# Patient Record
Sex: FEMALE | Race: WHITE | NOT HISPANIC OR LATINO | Employment: FULL TIME | URBAN - METROPOLITAN AREA
[De-identification: names, ages, dates, MRNs, and addresses within clinical notes are randomized per-mention and may not be internally consistent; named-entity substitution may affect disease eponyms.]

---

## 2017-04-02 ENCOUNTER — APPOINTMENT (EMERGENCY)
Dept: RADIOLOGY | Facility: HOSPITAL | Age: 47
End: 2017-04-02
Payer: COMMERCIAL

## 2017-04-02 ENCOUNTER — HOSPITAL ENCOUNTER (EMERGENCY)
Facility: HOSPITAL | Age: 47
Discharge: HOME | End: 2017-04-02
Attending: EMERGENCY MEDICINE
Payer: COMMERCIAL

## 2017-04-02 VITALS
TEMPERATURE: 97.7 F | DIASTOLIC BLOOD PRESSURE: 95 MMHG | HEART RATE: 95 BPM | SYSTOLIC BLOOD PRESSURE: 163 MMHG | OXYGEN SATURATION: 100 % | WEIGHT: 290 LBS | RESPIRATION RATE: 20 BRPM

## 2017-04-02 DIAGNOSIS — M25.569 KNEE PAIN: Primary | ICD-10-CM

## 2017-04-02 PROCEDURE — 73564 X-RAY EXAM KNEE 4 OR MORE: CPT

## 2017-04-02 PROCEDURE — 99283 EMERGENCY DEPT VISIT LOW MDM: CPT

## 2017-04-02 RX ORDER — LEVOTHYROXINE SODIUM 0.2 MG/1
200 TABLET ORAL DAILY
COMMUNITY
End: 2018-05-18 | Stop reason: ALTCHOICE

## 2017-04-02 RX ORDER — NAPROXEN 375 MG/1
375 TABLET ORAL 2 TIMES DAILY WITH MEALS
Qty: 14 TABLET | Refills: 0 | Status: SHIPPED | OUTPATIENT
Start: 2017-04-02 | End: 2018-05-18 | Stop reason: ALTCHOICE

## 2017-04-05 ENCOUNTER — ALLSCRIPTS OFFICE VISIT (OUTPATIENT)
Dept: OTHER | Facility: OTHER | Age: 47
End: 2017-04-05

## 2018-01-12 VITALS
HEIGHT: 64 IN | DIASTOLIC BLOOD PRESSURE: 85 MMHG | HEART RATE: 81 BPM | WEIGHT: 293 LBS | SYSTOLIC BLOOD PRESSURE: 129 MMHG | BODY MASS INDEX: 50.02 KG/M2

## 2018-05-18 ENCOUNTER — APPOINTMENT (EMERGENCY)
Dept: CT IMAGING | Facility: HOSPITAL | Age: 48
End: 2018-05-18
Payer: COMMERCIAL

## 2018-05-18 ENCOUNTER — HOSPITAL ENCOUNTER (EMERGENCY)
Facility: HOSPITAL | Age: 48
Discharge: HOME | End: 2018-05-18
Payer: COMMERCIAL

## 2018-05-18 VITALS
WEIGHT: 280 LBS | RESPIRATION RATE: 18 BRPM | OXYGEN SATURATION: 96 % | TEMPERATURE: 99 F | SYSTOLIC BLOOD PRESSURE: 148 MMHG | HEART RATE: 93 BPM | DIASTOLIC BLOOD PRESSURE: 73 MMHG | HEIGHT: 64 IN | BODY MASS INDEX: 47.8 KG/M2

## 2018-05-18 DIAGNOSIS — S16.1XXA STRAIN OF NECK MUSCLE, INITIAL ENCOUNTER: ICD-10-CM
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
## 2018-05-18 PROCEDURE — 99284 EMERGENCY DEPT VISIT MOD MDM: CPT

## 2018-05-18 PROCEDURE — 72125 CT NECK SPINE W/O DYE: CPT

## 2018-05-18 RX ORDER — CYCLOBENZAPRINE HCL 5 MG
5 TABLET ORAL 3 TIMES DAILY PRN
Qty: 30 TABLET | Refills: 0 | Status: SHIPPED | OUTPATIENT
Start: 2018-05-18 | End: 2018-05-28
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
## 2019-11-02 ENCOUNTER — OFFICE VISIT (OUTPATIENT)
Dept: URGENT CARE | Facility: MEDICAL CENTER | Age: 49
End: 2019-11-02
Payer: COMMERCIAL

## 2019-11-02 VITALS
BODY MASS INDEX: 51.91 KG/M2 | SYSTOLIC BLOOD PRESSURE: 136 MMHG | TEMPERATURE: 97.8 F | OXYGEN SATURATION: 100 % | WEIGHT: 293 LBS | HEART RATE: 80 BPM | RESPIRATION RATE: 18 BRPM | HEIGHT: 63 IN | DIASTOLIC BLOOD PRESSURE: 83 MMHG

## 2019-11-02 DIAGNOSIS — S39.012A STRAIN OF LUMBAR REGION, INITIAL ENCOUNTER: Primary | ICD-10-CM

## 2019-11-02 PROCEDURE — 99213 OFFICE O/P EST LOW 20 MIN: CPT | Performed by: PHYSICIAN ASSISTANT

## 2019-11-02 RX ORDER — CYCLOBENZAPRINE HCL 10 MG
10 TABLET ORAL 3 TIMES DAILY
Qty: 15 TABLET | Refills: 0 | Status: SHIPPED | OUTPATIENT
Start: 2019-11-02 | End: 2021-10-07

## 2019-11-02 NOTE — PATIENT INSTRUCTIONS
1  Motrin as needed for pain  2  Take Flexeril 10mg  Up to 3x daily as needed for pain/spasm  3  Follow up with PCP in 3-5 days if symptoms persist   4  Proceed to  ER if symptoms worsen

## 2019-11-02 NOTE — PROGRESS NOTES
[facility] [facility]        NAME: Gilbert Chao is a 51 y o  female  : 1970    MRN: [id number]  DATE: 2019  TIME: 10:04 AM    Assessment and Plan   Strain of lumbar region, initial encounter [S39 012A]  1  Strain of lumbar region, initial encounter  cyclobenzaprine (FLEXERIL) 10 mg tablet         Patient Instructions     1  Motrin as needed for pain  2  Take Flexeril 10mg  Up to 3x daily as needed for pain/spasm  3  Follow up with PCP in 3-5 days if symptoms persist   4  Proceed to  ER if symptoms worsen  Chief Complaint     Chief Complaint   Patient presents with    Back Pain     Pt  C/O back pain for the past two days that began after twisting her back Thursday night  History of Present Illness       Dufm Blizzard is a 43-year-old female presents with left lower back pain that developed over the past 2 days  Patient reports she was bending, attempting to pick something up that fell from a garbage can when she felt discomfort in her left lower back  Patient denies any numbness, tingling, weakness or change in bowel or bladder functions from the onset of her symptoms  Patient reports tightness/stiffness in her lumbar region but no radiation of the pain into her buttocks, anterior thigh her down her leg  Review of Systems   Review of Systems   Constitutional: Negative  Gastrointestinal: Negative  Musculoskeletal: Positive for back pain  Negative for gait problem  Neurological: Negative for weakness and numbness           Current Medications       Current Outpatient Medications:     cyclobenzaprine (FLEXERIL) 10 mg tablet, Take 1 tablet (10 mg total) by mouth 3 (three) times a day for 5 days, Disp: 15 tablet, Rfl: 0    Current Allergies     Allergies as of 2019 - Reviewed 2019   Allergen Reaction Noted    Penicillins  2017    Sulfa antibiotics  2017            The following portions of the patient's history were reviewed and updated as appropriate: allergies, current medications, past family history, past medical history, past social history, past surgical history and problem list      Past Medical History:   Diagnosis Date    Disease of thyroid gland     RA (rheumatoid arthritis) ([facility] [address] )        Past Surgical History:   Procedure Laterality Date    HYSTERECTOMY      TONSILLECTOMY      WISDOM TOOTH EXTRACTION         No family history on file  Medications have been verified  Objective   /83   Pulse 80   Temp 97 8 °F (36 6 °C) (Temporal)   Resp 18   Ht 5' 3" (1 6 m)   Wt (!) 141 kg (310 lb 9 6 oz)   SpO2 100%   BMI 55 02 kg/m²        Physical Exam     Physical Exam   Constitutional: She appears well-developed and well-nourished  No distress  Cardiovascular: Normal rate, regular rhythm and normal heart sounds  No murmur heard  Pulmonary/Chest: Effort normal and breath sounds normal    Musculoskeletal:        Back:    Neurological: She has normal strength  No sensory deficit  Reflex Scores:       Patellar reflexes are 2+ on the right side and 2+ on the left side         Achilles reflexes are 2+ on the right side and 2+ on the left side   - SLR

## 2020-12-09 ENCOUNTER — NURSE TRIAGE (OUTPATIENT)
Dept: OTHER | Facility: OTHER | Age: 50
End: 2020-12-09

## 2021-10-01 ENCOUNTER — OFFICE VISIT (OUTPATIENT)
Dept: URGENT CARE | Facility: MEDICAL CENTER | Age: 51
End: 2021-10-01
Payer: COMMERCIAL

## 2021-10-01 VITALS
BODY MASS INDEX: 50.02 KG/M2 | HEIGHT: 64 IN | SYSTOLIC BLOOD PRESSURE: 140 MMHG | DIASTOLIC BLOOD PRESSURE: 78 MMHG | RESPIRATION RATE: 18 BRPM | WEIGHT: 293 LBS | HEART RATE: 84 BPM | TEMPERATURE: 97.5 F | OXYGEN SATURATION: 100 %

## 2021-10-01 DIAGNOSIS — M25.559 HIP PAIN: Primary | ICD-10-CM

## 2021-10-01 PROCEDURE — 99213 OFFICE O/P EST LOW 20 MIN: CPT | Performed by: PHYSICIAN ASSISTANT

## 2021-10-01 RX ORDER — METHOCARBAMOL 500 MG/1
500 TABLET, FILM COATED ORAL 4 TIMES DAILY
Qty: 20 TABLET | Refills: 0 | Status: SHIPPED | OUTPATIENT
Start: 2021-10-01 | End: 2021-10-06

## 2021-10-07 ENCOUNTER — OFFICE VISIT (OUTPATIENT)
Dept: OBGYN CLINIC | Facility: CLINIC | Age: 51
End: 2021-10-07
Payer: COMMERCIAL

## 2021-10-07 VITALS
HEIGHT: 64 IN | HEART RATE: 60 BPM | WEIGHT: 293 LBS | BODY MASS INDEX: 50.02 KG/M2 | DIASTOLIC BLOOD PRESSURE: 82 MMHG | SYSTOLIC BLOOD PRESSURE: 140 MMHG

## 2021-10-07 DIAGNOSIS — M47.816 LUMBAR FACET ARTHROPATHY: Primary | ICD-10-CM

## 2021-10-07 DIAGNOSIS — M54.16 RADICULOPATHY, LUMBAR REGION: ICD-10-CM

## 2021-10-07 DIAGNOSIS — M70.62 TROCHANTERIC BURSITIS OF LEFT HIP: ICD-10-CM

## 2021-10-07 DIAGNOSIS — R29.898 WEAKNESS OF BOTH HIPS: ICD-10-CM

## 2021-10-07 DIAGNOSIS — M51.26 BULGING LUMBAR DISC: ICD-10-CM

## 2021-10-07 DIAGNOSIS — M76.32 ILIOTIBIAL BAND SYNDROME, LEFT: ICD-10-CM

## 2021-10-07 PROCEDURE — 99204 OFFICE O/P NEW MOD 45 MIN: CPT | Performed by: FAMILY MEDICINE

## 2021-10-07 RX ORDER — DICLOFENAC SODIUM 75 MG/1
75 TABLET, DELAYED RELEASE ORAL 2 TIMES DAILY
Qty: 60 TABLET | Refills: 0 | Status: SHIPPED | OUTPATIENT
Start: 2021-10-07

## 2021-10-07 RX ORDER — PREDNISONE 20 MG/1
20 TABLET ORAL 2 TIMES DAILY WITH MEALS
Qty: 10 TABLET | Refills: 0 | Status: SHIPPED | OUTPATIENT
Start: 2021-10-07 | End: 2021-10-12

## 2021-10-13 ENCOUNTER — EVALUATION (OUTPATIENT)
Dept: PHYSICAL THERAPY | Facility: MEDICAL CENTER | Age: 51
End: 2021-10-13
Payer: COMMERCIAL

## 2021-10-13 DIAGNOSIS — M76.32 ILIOTIBIAL BAND SYNDROME, LEFT: ICD-10-CM

## 2021-10-13 DIAGNOSIS — M54.16 RADICULOPATHY, LUMBAR REGION: ICD-10-CM

## 2021-10-13 DIAGNOSIS — R29.898 WEAKNESS OF BOTH HIPS: ICD-10-CM

## 2021-10-13 DIAGNOSIS — M51.26 BULGING LUMBAR DISC: ICD-10-CM

## 2021-10-13 DIAGNOSIS — M70.62 TROCHANTERIC BURSITIS OF LEFT HIP: ICD-10-CM

## 2021-10-13 DIAGNOSIS — M25.552 LEFT HIP PAIN: Primary | ICD-10-CM

## 2021-10-13 DIAGNOSIS — M47.816 LUMBAR FACET ARTHROPATHY: ICD-10-CM

## 2021-10-13 PROCEDURE — 97162 PT EVAL MOD COMPLEX 30 MIN: CPT | Performed by: PHYSICAL THERAPIST

## 2021-10-13 PROCEDURE — 97140 MANUAL THERAPY 1/> REGIONS: CPT | Performed by: PHYSICAL THERAPIST

## 2021-10-18 ENCOUNTER — OFFICE VISIT (OUTPATIENT)
Dept: PHYSICAL THERAPY | Facility: MEDICAL CENTER | Age: 51
End: 2021-10-18
Payer: COMMERCIAL

## 2021-10-18 DIAGNOSIS — M51.26 BULGING LUMBAR DISC: ICD-10-CM

## 2021-10-18 DIAGNOSIS — R29.898 WEAKNESS OF BOTH HIPS: ICD-10-CM

## 2021-10-18 DIAGNOSIS — M70.62 TROCHANTERIC BURSITIS OF LEFT HIP: ICD-10-CM

## 2021-10-18 DIAGNOSIS — M25.552 LEFT HIP PAIN: Primary | ICD-10-CM

## 2021-10-18 DIAGNOSIS — M76.32 ILIOTIBIAL BAND SYNDROME, LEFT: ICD-10-CM

## 2021-10-18 DIAGNOSIS — M47.816 LUMBAR FACET ARTHROPATHY: ICD-10-CM

## 2021-10-18 PROCEDURE — 97140 MANUAL THERAPY 1/> REGIONS: CPT | Performed by: PHYSICAL THERAPIST

## 2021-10-18 PROCEDURE — 97110 THERAPEUTIC EXERCISES: CPT | Performed by: PHYSICAL THERAPIST

## 2021-10-20 ENCOUNTER — OFFICE VISIT (OUTPATIENT)
Dept: PHYSICAL THERAPY | Facility: MEDICAL CENTER | Age: 51
End: 2021-10-20
Payer: COMMERCIAL

## 2021-10-20 DIAGNOSIS — M54.16 RADICULOPATHY, LUMBAR REGION: ICD-10-CM

## 2021-10-20 DIAGNOSIS — R29.898 WEAKNESS OF BOTH HIPS: ICD-10-CM

## 2021-10-20 DIAGNOSIS — M25.552 LEFT HIP PAIN: Primary | ICD-10-CM

## 2021-10-20 DIAGNOSIS — M51.26 BULGING LUMBAR DISC: ICD-10-CM

## 2021-10-20 DIAGNOSIS — M76.32 ILIOTIBIAL BAND SYNDROME, LEFT: ICD-10-CM

## 2021-10-20 DIAGNOSIS — M70.62 TROCHANTERIC BURSITIS OF LEFT HIP: ICD-10-CM

## 2021-10-20 PROCEDURE — 97110 THERAPEUTIC EXERCISES: CPT | Performed by: PHYSICAL THERAPIST

## 2021-10-20 PROCEDURE — 97140 MANUAL THERAPY 1/> REGIONS: CPT | Performed by: PHYSICAL THERAPIST

## 2021-10-25 ENCOUNTER — OFFICE VISIT (OUTPATIENT)
Dept: PHYSICAL THERAPY | Facility: MEDICAL CENTER | Age: 51
End: 2021-10-25
Payer: COMMERCIAL

## 2021-10-25 DIAGNOSIS — R29.898 WEAKNESS OF BOTH HIPS: ICD-10-CM

## 2021-10-25 DIAGNOSIS — M51.26 BULGING LUMBAR DISC: ICD-10-CM

## 2021-10-25 DIAGNOSIS — M54.16 RADICULOPATHY, LUMBAR REGION: ICD-10-CM

## 2021-10-25 DIAGNOSIS — M25.552 LEFT HIP PAIN: Primary | ICD-10-CM

## 2021-10-25 DIAGNOSIS — M76.32 ILIOTIBIAL BAND SYNDROME, LEFT: ICD-10-CM

## 2021-10-25 DIAGNOSIS — M70.62 TROCHANTERIC BURSITIS OF LEFT HIP: ICD-10-CM

## 2021-10-25 DIAGNOSIS — M47.816 LUMBAR FACET ARTHROPATHY: ICD-10-CM

## 2021-10-25 PROCEDURE — 97110 THERAPEUTIC EXERCISES: CPT | Performed by: PHYSICAL THERAPIST

## 2021-10-25 PROCEDURE — 97112 NEUROMUSCULAR REEDUCATION: CPT | Performed by: PHYSICAL THERAPIST

## 2021-10-25 PROCEDURE — 97140 MANUAL THERAPY 1/> REGIONS: CPT | Performed by: PHYSICAL THERAPIST

## 2021-10-27 ENCOUNTER — OFFICE VISIT (OUTPATIENT)
Dept: PHYSICAL THERAPY | Facility: MEDICAL CENTER | Age: 51
End: 2021-10-27
Payer: COMMERCIAL

## 2021-10-27 DIAGNOSIS — R29.898 WEAKNESS OF BOTH HIPS: ICD-10-CM

## 2021-10-27 DIAGNOSIS — M51.26 BULGING LUMBAR DISC: ICD-10-CM

## 2021-10-27 DIAGNOSIS — M76.32 ILIOTIBIAL BAND SYNDROME, LEFT: ICD-10-CM

## 2021-10-27 DIAGNOSIS — M54.16 RADICULOPATHY, LUMBAR REGION: ICD-10-CM

## 2021-10-27 DIAGNOSIS — M25.552 LEFT HIP PAIN: Primary | ICD-10-CM

## 2021-10-27 DIAGNOSIS — M47.816 LUMBAR FACET ARTHROPATHY: ICD-10-CM

## 2021-10-27 DIAGNOSIS — M70.62 TROCHANTERIC BURSITIS OF LEFT HIP: ICD-10-CM

## 2021-10-27 PROCEDURE — 97140 MANUAL THERAPY 1/> REGIONS: CPT | Performed by: PHYSICAL THERAPIST

## 2021-10-27 PROCEDURE — 97110 THERAPEUTIC EXERCISES: CPT | Performed by: PHYSICAL THERAPIST

## 2021-11-01 ENCOUNTER — OFFICE VISIT (OUTPATIENT)
Dept: PHYSICAL THERAPY | Facility: MEDICAL CENTER | Age: 51
End: 2021-11-01
Payer: COMMERCIAL

## 2021-11-01 DIAGNOSIS — R29.898 WEAKNESS OF BOTH HIPS: ICD-10-CM

## 2021-11-01 DIAGNOSIS — M47.816 LUMBAR FACET ARTHROPATHY: ICD-10-CM

## 2021-11-01 DIAGNOSIS — M76.32 ILIOTIBIAL BAND SYNDROME, LEFT: ICD-10-CM

## 2021-11-01 DIAGNOSIS — M25.552 LEFT HIP PAIN: Primary | ICD-10-CM

## 2021-11-01 DIAGNOSIS — M54.16 RADICULOPATHY, LUMBAR REGION: ICD-10-CM

## 2021-11-01 DIAGNOSIS — M51.26 BULGING LUMBAR DISC: ICD-10-CM

## 2021-11-01 DIAGNOSIS — M70.62 TROCHANTERIC BURSITIS OF LEFT HIP: ICD-10-CM

## 2021-11-01 PROCEDURE — 97110 THERAPEUTIC EXERCISES: CPT | Performed by: PHYSICAL THERAPIST

## 2021-11-01 PROCEDURE — 97140 MANUAL THERAPY 1/> REGIONS: CPT | Performed by: PHYSICAL THERAPIST

## 2021-11-03 ENCOUNTER — OFFICE VISIT (OUTPATIENT)
Dept: PHYSICAL THERAPY | Facility: MEDICAL CENTER | Age: 51
End: 2021-11-03
Payer: COMMERCIAL

## 2021-11-03 DIAGNOSIS — R29.898 WEAKNESS OF BOTH HIPS: ICD-10-CM

## 2021-11-03 DIAGNOSIS — M25.552 LEFT HIP PAIN: Primary | ICD-10-CM

## 2021-11-03 DIAGNOSIS — M70.62 TROCHANTERIC BURSITIS OF LEFT HIP: ICD-10-CM

## 2021-11-03 DIAGNOSIS — M54.16 RADICULOPATHY, LUMBAR REGION: ICD-10-CM

## 2021-11-03 DIAGNOSIS — M51.26 BULGING LUMBAR DISC: ICD-10-CM

## 2021-11-03 DIAGNOSIS — M47.816 LUMBAR FACET ARTHROPATHY: ICD-10-CM

## 2021-11-03 DIAGNOSIS — M76.32 ILIOTIBIAL BAND SYNDROME, LEFT: ICD-10-CM

## 2021-11-03 PROCEDURE — 97110 THERAPEUTIC EXERCISES: CPT | Performed by: PHYSICAL THERAPIST

## 2021-11-03 PROCEDURE — 97140 MANUAL THERAPY 1/> REGIONS: CPT | Performed by: PHYSICAL THERAPIST

## 2021-11-08 ENCOUNTER — OFFICE VISIT (OUTPATIENT)
Dept: PHYSICAL THERAPY | Facility: MEDICAL CENTER | Age: 51
End: 2021-11-08
Payer: COMMERCIAL

## 2021-11-08 DIAGNOSIS — M25.552 LEFT HIP PAIN: Primary | ICD-10-CM

## 2021-11-08 DIAGNOSIS — M70.62 TROCHANTERIC BURSITIS OF LEFT HIP: ICD-10-CM

## 2021-11-08 DIAGNOSIS — R29.898 WEAKNESS OF BOTH HIPS: ICD-10-CM

## 2021-11-08 DIAGNOSIS — M76.32 ILIOTIBIAL BAND SYNDROME, LEFT: ICD-10-CM

## 2021-11-08 DIAGNOSIS — M54.16 RADICULOPATHY, LUMBAR REGION: ICD-10-CM

## 2021-11-08 DIAGNOSIS — M51.26 BULGING LUMBAR DISC: ICD-10-CM

## 2021-11-08 DIAGNOSIS — M47.816 LUMBAR FACET ARTHROPATHY: ICD-10-CM

## 2021-11-08 PROCEDURE — 97140 MANUAL THERAPY 1/> REGIONS: CPT | Performed by: PHYSICAL THERAPIST

## 2021-11-08 PROCEDURE — 97110 THERAPEUTIC EXERCISES: CPT | Performed by: PHYSICAL THERAPIST

## 2021-11-10 ENCOUNTER — EVALUATION (OUTPATIENT)
Dept: PHYSICAL THERAPY | Facility: MEDICAL CENTER | Age: 51
End: 2021-11-10
Payer: COMMERCIAL

## 2021-11-10 DIAGNOSIS — R29.898 WEAKNESS OF BOTH HIPS: ICD-10-CM

## 2021-11-10 DIAGNOSIS — M76.32 ILIOTIBIAL BAND SYNDROME, LEFT: ICD-10-CM

## 2021-11-10 DIAGNOSIS — M25.552 LEFT HIP PAIN: Primary | ICD-10-CM

## 2021-11-10 DIAGNOSIS — M51.26 BULGING LUMBAR DISC: ICD-10-CM

## 2021-11-10 DIAGNOSIS — M70.62 TROCHANTERIC BURSITIS OF LEFT HIP: ICD-10-CM

## 2021-11-10 DIAGNOSIS — M54.16 RADICULOPATHY, LUMBAR REGION: ICD-10-CM

## 2021-11-10 PROCEDURE — 97140 MANUAL THERAPY 1/> REGIONS: CPT | Performed by: PHYSICAL THERAPIST

## 2021-11-15 ENCOUNTER — APPOINTMENT (OUTPATIENT)
Dept: PHYSICAL THERAPY | Facility: MEDICAL CENTER | Age: 51
End: 2021-11-15
Payer: COMMERCIAL

## 2021-11-17 ENCOUNTER — APPOINTMENT (OUTPATIENT)
Dept: PHYSICAL THERAPY | Facility: MEDICAL CENTER | Age: 51
End: 2021-11-17
Payer: COMMERCIAL

## 2021-11-22 ENCOUNTER — APPOINTMENT (OUTPATIENT)
Dept: PHYSICAL THERAPY | Facility: MEDICAL CENTER | Age: 51
End: 2021-11-22
Payer: COMMERCIAL

## 2021-11-24 ENCOUNTER — APPOINTMENT (OUTPATIENT)
Dept: PHYSICAL THERAPY | Facility: MEDICAL CENTER | Age: 51
End: 2021-11-24
Payer: COMMERCIAL

## 2021-11-29 ENCOUNTER — APPOINTMENT (OUTPATIENT)
Dept: PHYSICAL THERAPY | Facility: MEDICAL CENTER | Age: 51
End: 2021-11-29
Payer: COMMERCIAL

## 2024-06-19 ENCOUNTER — OFFICE VISIT (OUTPATIENT)
Dept: BARIATRICS | Facility: CLINIC | Age: 54
End: 2024-06-19
Payer: COMMERCIAL

## 2024-06-19 VITALS
DIASTOLIC BLOOD PRESSURE: 80 MMHG | HEIGHT: 63 IN | BODY MASS INDEX: 51.91 KG/M2 | WEIGHT: 293 LBS | HEART RATE: 91 BPM | SYSTOLIC BLOOD PRESSURE: 130 MMHG

## 2024-06-19 DIAGNOSIS — E66.01 CLASS 3 SEVERE OBESITY DUE TO EXCESS CALORIES WITH BODY MASS INDEX (BMI) OF 60.0 TO 69.9 IN ADULT, UNSPECIFIED WHETHER SERIOUS COMORBIDITY PRESENT (HCC): Primary | ICD-10-CM

## 2024-06-19 PROBLEM — E66.813 CLASS 3 SEVERE OBESITY DUE TO EXCESS CALORIES WITH BODY MASS INDEX (BMI) OF 60.0 TO 69.9 IN ADULT (HCC): Status: ACTIVE | Noted: 2024-06-19

## 2024-06-19 PROCEDURE — 99204 OFFICE O/P NEW MOD 45 MIN: CPT | Performed by: INTERNAL MEDICINE

## 2024-06-19 RX ORDER — TURMERIC 400 MG
2 CAPSULE ORAL DAILY
COMMUNITY

## 2024-06-19 RX ORDER — DIPHENOXYLATE HYDROCHLORIDE AND ATROPINE SULFATE 2.5; .025 MG/1; MG/1
1 TABLET ORAL DAILY
COMMUNITY

## 2024-06-19 RX ORDER — LEVOTHYROXINE SODIUM 0.07 MG/1
75 TABLET ORAL DAILY
COMMUNITY

## 2024-06-19 RX ORDER — METFORMIN HYDROCHLORIDE 500 MG/1
TABLET, EXTENDED RELEASE ORAL
Qty: 60 TABLET | Refills: 3 | Status: SHIPPED | OUTPATIENT
Start: 2024-06-19

## 2024-06-19 NOTE — ASSESSMENT & PLAN NOTE
Nutrition: Discussed that patient would benefit from incorporating some protein based snacks in between meals to ensure meeting protein targets  -Work with RD for nutrition prescription including a calorie goal and protein target    Physical Activity: Encouraged 20-30 min walks; provided information regarding [id number] fitness program.  Consider 2 to 3 days of strength training using resistance bands and YouTube videos    Sleep: -STOP- BANG- 3/8 ; 7  hours -    Behavioral/Stress: Start tracking current intake using MFP, so adjustments can be made by the dietitian.  Could consider meeting with the LCSW to address emotional eating if needed  Antiobesity Medications/Medical -- Class III obesity with comorbidities of prediabetes, checking additional labs  -Discussed various medication options with the patient  -Given underlying insulin resistance and prediabetes we decided to start with metformin  -Other options such as Qsymia and its components phentermine and topiramate were discussed (BP today 130/80 heart rate of 91); no current contraindications  -Contrave and its components were discussed-patient describes some emotional eating so this could be a good option as well    -Patient has inquired with her insurance and they need a prior authorization for injectable medications such as Wegovy and Zepbound  -Inform patient regarding supply concerns with these agents  -Patient reports that she would prefer to start with an oral agent and a lifestyle program and make decisions regarding more aggressive medical therapy such as injectables down the road  -Patient was considered for the [facility] bariatric surgery program 1.5 years ago and had attended initial classes; patient decided against doing bariatric surgery and would like to do medical weight loss

## 2024-06-19 NOTE — PROGRESS NOTES
Assessment/Plan     Judy Pollard is 54 y.o. year old female  who comes in for consultation for assistance with weight management.     - Discussed options of HealthyCORE-Intensive Lifestyle Intervention Program, Very Low Calorie Diet-VLCD, and Conservative Program and the role of weight loss medications.  - Patient is interested in pursuing HealthyCORE-Intensive Lifestyle Intervention Program    Class 3 severe obesity due to excess calories with body mass index (BMI) of 60.0 to 69.9 in adult (HCC)  Nutrition: Discussed that patient would benefit from incorporating some protein based snacks in between meals to ensure meeting protein targets  -Work with RD for nutrition prescription including a calorie goal and protein target    Physical Activity: Encouraged 20-30 min walks; provided information regarding [employer] fitness program.  Consider 2 to 3 days of strength training using resistance bands and Formotus videos    Sleep: -STOP- BANG- 3/8 ; 7  hours -    Behavioral/Stress: Start tracking current intake using MFP, so adjustments can be made by the dietitian.  Could consider meeting with the LCSW to address emotional eating if needed  Antiobesity Medications/Medical -- Class III obesity with comorbidities of prediabetes, checking additional labs  -Discussed various medication options with the patient  -Given underlying insulin resistance and prediabetes we decided to start with metformin  -Other options such as Qsymia and its components phentermine and topiramate were discussed (BP today 130/80 heart rate of 91); no current contraindications  -Contrave and its components were discussed-patient describes some emotional eating so this could be a good option as well    -Patient has inquired with her insurance and they need a prior authorization for injectable medications such as Wegovy and Zepbound  -Inform patient regarding supply concerns with these agents  -Patient reports that she would prefer to start with an oral  agent and a lifestyle program and make decisions regarding more aggressive medical therapy such as injectables down the road  -Patient was considered for the [facility] bariatric surgery program 1.5 years ago and had attended initial classes; patient decided against doing bariatric surgery and would like to do medical weight loss      Judy was seen today for consult.    Diagnoses and all orders for this visit:    Class 3 severe obesity due to excess calories with body mass index (BMI) of 60.0 to 69.9 in adult, unspecified whether serious comorbidity present (HCC)  -     Insulin, fasting; Future  -     T3, free; Future  -     TSH, 3rd generation with Free T4 reflex; Future  -     Vitamin D 25 hydroxy; Future  -     Hemoglobin A1C; Future  -     Lipid panel; Future  -     metFORMIN (GLUCOPHAGE-XR) 500 mg 24 hr tablet; Start with 500 mg orally once daily with the evening meal increase in two weeks to 1000 mg (two tablets) if tolerated          -In addition, please follow general recommendations below.          Return visit:  6-8 weeks        General Lifestyle recommendations:    Nutrition   -Avoid skipping meals. Avoid sugary beverages. At least 64oz of water daily.  Limit processed food, refined sugars and grain. Encourage  healthy choices for meals and snacks   -Focus on protein goals and non starchy fiber rich vegetables for satiety effect and to help support a calorie deficit.   - Emphasize portion control, well balanced macronutrient's (protein, carbohydrate, fat using MyPlate method )and adequate protein with each meal/snacks and distributing calories equally throughout the day along with.   -Advise starting the day with a protein breakfast   Behavioral/Stress   Food log via alexia or provided paper log (alexia options include www.Seeqpod.com, sparkpeople.com, loseit.com, calorieking.com, Craft Dragon). Encouraged mindful eating. Be sure to set aside time to eat, eat slowly, and savor your food. Consider  "meditation apps and/or taking a few minutes of mindfulness every AM. Understand the role of regarding the role of stress hormone cortisol in promoting weight gain and visceral fat accumulation. Weigh daily or atleast 2-3 times/ week  Physical Activity   Increase physical activity by 10 minutes daily. Gradually increase physical activity to a goal of 5 days per week for 30 minutes of MODERATE intensity ( should be able to pass the \"talk test\" but should not be able to sing. Target 150-300 minutes  PLUS 2 days per week of FULL BODY resistance training. Progression will be addressed at follow up visits. Encouraged contemplation regarding establishing a daily physical activity routine  - Resistance training along with increase protein intake is important to maintain and enhance metabolism  Sleep   Encourage sleep hygiene and importance of having adequate sleep duration at least > 6 hours to support response in weight loss efforts    Handouts provided :  THRIVE program at [facility]  MyPlate and food quality  Food log resources, phone alexia or paper journal  Antiobesity medications options     - Discussed at length and the role of weight loss medications and medication options   - Explained the importance of making lifestyle changes in addition to starting any anti-obesity medications if the  patient chooses.  -  Initial weight loss goal of 5-10% weight loss for improved health  - Weight loss can improve patient's co-morbid conditions and/or prevent weight-related complications.  - Weight is not at goal and patient has been unable to achieve a meaningful weight loss above 5% using various programs and tools for more than 6 months    Judy was seen today for consult.    Diagnoses and all orders for this visit:    Class 3 severe obesity due to excess calories with body mass index (BMI) of 60.0 to 69.9 in adult, unspecified whether serious comorbidity present (HCC)  -     Insulin, fasting; Future  -     T3, free; " Future  -     TSH, 3rd generation with Free T4 reflex; Future  -     Vitamin D 25 hydroxy; Future  -     Hemoglobin A1C; Future  -     Lipid panel; Future  -     metFORMIN (GLUCOPHAGE-XR) 500 mg 24 hr tablet; Start with 500 mg orally once daily with the evening meal increase in two weeks to 1000 mg (two tablets) if tolerated         Metformin instructions: Cautions: Nausea and diarrhea are the most common side effects which may improve in 1 to 2 weeks. Hypoglycemia may occur with insufficient calorie intake, strenuous exercise, or concomitant use of hypoglycemic agents or ethanol; increased risk in elderly.   Low vitamin B12 levels may occur; monitoring recommended.  Recommended supplementation with a good-quality over-the-counter B complex vitamin.  B12 levels will be checked yearly and additional supplementation recommended if needed.    Adverse effects   Cobalamin deficiency (7% to 17.4% )  Gastrointestinal: Diarrhea (53% (immediate-release) ; 10% to 13% (extended-release) ), Flatulence , Indigestion, Malabsorption syndrome, Nausea (7% (extended-release) ), Vomiting (Up to 25.5% )             Total time spent reviewing chart, interviewing patient, examining patient, discussing plan, answering all questions, and documentin min, with >50% face-to-face time spent counseling patient on nonsurgical interventions for the treatment of excess weight. Discussed in detail nonsurgical options including intensive lifestyle intervention program, very low-calorie diet program and conservative program.  Discussed the role of weight loss medications.  Counseled patient on diet behavior and exercise modification for weight loss.            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, WFH, 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    Psycho social-lives with     Gyneac (Menopausal status/periods/contraception)-full hysterectomy at age 42         Start date: 6/19/24   Intial weight : 340 lbs  Intial BMI: 60.23 kg/m2  Obesity Class: Above 40- Obesity Class III  Goal weight: 220-230 lbs      Colonoscopy: UTD  Mammogram: UTD    History of present illness       Onset-- most of her life starting in her teens , mostly was after hysterectomy 2010, attributes stress and stress eating as one of the major contributors due to aging parents and aging in- laws    Fam hx of obesity-  both parents reports sister had bariatric surgery and is currently on Wegovy    Food behaviorsstress/emotional eating-Food is comforting    Previous Weight loss medications/Weight loss attempts:   [facility].  Due to hysterectomy and 2 C-sections she did not want to go through bariatric surgery. Insurance changed    Done weight watchers off-and-on for 30 years, easily gains weight with extra points  Patient reports and other history-    Self referred   Wt Readings from Last 20 Encounters:   06/19/24 (!) 154 kg (340 lb)   10/07/21 (!) 145 kg (318 lb 12.8 oz)   10/01/21 (!) 145 kg (319 lb)   11/02/19 (!) 141 kg (310 lb 9.6 oz)   05/18/18 127 kg (280 lb)   04/05/17 (!) 139 kg (307 lb)   04/02/17 132 kg (290 lb)           Medication considerations/contraindications     -Patient denies personal history of pancreatitis. Patient also denies personal and family history of medullary thyroid cancer and multiple endocrine neoplasia type 2 (MEN 2 tumor). -Patient denies any history of kidney stones, seizures, or glaucoma,  "diabetic retinopathy, gall bladder disease, hyperthyroidism.  -Denies Hx of CAD, PAD, palpitations, arrhythmia.   -Denies uncontrolled anxiety or depression, suicidal behavior or thinking , insomnia or sleep disturbance.         Past medical history/past surgical history       Previous notes and records have been reviewed.    The following portions of the patient's history were reviewed and updated as appropriate: allergies, current medications, past family history, past medical history, past social history, past surgical history, and problem list.    Past Medical History:   Diagnosis Date    Disease of thyroid gland     RA (rheumatoid arthritis) (HCC)          Past Surgical History:   Procedure Laterality Date    HYSTERECTOMY      TONSILLECTOMY      WISDOM TOOTH EXTRACTION               Family History   Problem Relation Age of Onset    Arthritis Mother     Hearing loss Mother         wears hearing aids    Hyperlipidemia Mother     Hyperlipidemia Father     Deep vein thrombosis Father     Cancer Maternal Aunt         breast            Objective     /80 (BP Location: Left arm, Patient Position: Sitting, Cuff Size: Large)   Pulse 91   Ht 5' 3\" (1.6 m)   Wt (!) 154 kg (340 lb)   BMI 60.23 kg/m²       Review of Systems   Constitutional:  Negative for fatigue.   HENT:  Negative for sore throat.    Respiratory:  Negative for cough and shortness of breath.    Cardiovascular:  Negative for chest pain, palpitations and leg swelling.   Gastrointestinal:  Negative for abdominal pain, constipation, diarrhea and nausea.   Genitourinary:  Negative for dysuria.   Musculoskeletal:  Negative for arthralgias and back pain.   Skin:  Negative for rash.   Neurological:  Negative for headaches.   Psychiatric/Behavioral:  Negative for dysphoric mood. The patient is not nervous/anxious.        Physical Exam  Vitals and nursing note reviewed.   Constitutional:       Appearance: Normal appearance.   HENT:      Head: Normocephalic. "   Neck:      Thyroid: No thyroid mass, thyromegaly or thyroid tenderness.   Cardiovascular:      Rate and Rhythm: Normal rate and regular rhythm.      Pulses: Normal pulses.      Heart sounds: Normal heart sounds.   Pulmonary:      Effort: Pulmonary effort is normal.      Breath sounds: Normal breath sounds.   Abdominal:      General: Bowel sounds are normal.      Palpations: Abdomen is soft.   Musculoskeletal:      Cervical back: Normal range of motion and neck supple. No tenderness.      Right lower leg: No edema.      Left lower leg: No edema.   Skin:     General: Skin is warm and dry.   Neurological:      General: No focal deficit present.      Mental Status: She is alert and oriented to person, place, and time.   Psychiatric:         Mood and Affect: Mood normal.         Behavior: Behavior normal.         Thought Content: Thought content normal.         Judgment: Judgment normal.            Medications       Current Outpatient Medications:     Glucosamine-Chondroitin (GLUCOSAMINE CHONDR COMPLEX PO), Take 2 tablets by mouth daily, Disp: , Rfl:     levothyroxine 75 mcg tablet, Take 75 mcg by mouth daily, Disp: , Rfl:     metFORMIN (GLUCOPHAGE-XR) 500 mg 24 hr tablet, Start with 500 mg orally once daily with the evening meal increase in two weeks to 1000 mg (two tablets) if tolerated, Disp: 60 tablet, Rfl: 3    multivitamin (THERAGRAN) TABS, Take 1 tablet by mouth daily, Disp: , Rfl:     TART CHERRY PO, Take 2 capsules by mouth daily, Disp: , Rfl:     Turmeric 400 MG CAPS, Take 2 capsules by mouth daily, Disp: , Rfl:     diclofenac (VOLTAREN) 75 mg EC tablet, Take 1 tablet (75 mg total) by mouth 2 (two) times a day (Patient not taking: Reported on 6/19/2024), Disp: 60 tablet, Rfl: 0    methocarbamol (ROBAXIN) 500 mg tablet, Take 1 tablet (500 mg total) by mouth 4 (four) times a day for 5 days (Patient not taking: Reported on 6/19/2024), Disp: 20 tablet, Rfl: 0           Labs and imaging     Recent labs and imaging  "have been personally reviewed.  Lab Results   Component Value Date    WBC 7.69 01/01/2016    HGB 11.7 01/01/2016    HCT 37.4 01/01/2016    MCV 81 (L) 01/01/2016     01/01/2016     Lab Results   Component Value Date     01/01/2016    SODIUM 143 10/09/2023    K 4.4 10/09/2023     10/09/2023    CO2 28 10/09/2023    ANIONGAP 5 01/01/2016    AGAP 8 10/09/2023    BUN 10 10/09/2023    CREATININE 0.91 10/09/2023    GLUC 106 (H) 10/09/2023    CALCIUM 9.3 10/09/2023    AST 20 10/09/2023    ALT 22 10/09/2023    ALKPHOS 68 10/09/2023    PROT 6.9 12/26/2015    TP 6.7 10/09/2023    BILITOT 0.95 12/26/2015    TBILI 0.9 10/09/2023    EGFR 76 10/09/2023     Lab Results   Component Value Date    HGBA1C 6.1 (H) 10/09/2023     Lab Results   Component Value Date    TSH 14.79 (H) 10/09/2023     No results found for: \"CHOLESTEROL\"  No results found for: \"HDL\"  No results found for: \"TRIG\"  No results found for: \"LDLCALC\"                   "

## 2024-06-21 DIAGNOSIS — Z00.6 ENCOUNTER FOR EXAMINATION FOR NORMAL COMPARISON OR CONTROL IN CLINICAL RESEARCH PROGRAM: ICD-10-CM

## 2024-06-22 ENCOUNTER — APPOINTMENT (OUTPATIENT)
Dept: LAB | Facility: MEDICAL CENTER | Age: 54
End: 2024-06-22
Payer: COMMERCIAL

## 2024-06-22 DIAGNOSIS — E66.01 CLASS 3 SEVERE OBESITY DUE TO EXCESS CALORIES WITH BODY MASS INDEX (BMI) OF 60.0 TO 69.9 IN ADULT, UNSPECIFIED WHETHER SERIOUS COMORBIDITY PRESENT (HCC): ICD-10-CM

## 2024-06-22 DIAGNOSIS — Z00.6 ENCOUNTER FOR EXAMINATION FOR NORMAL COMPARISON OR CONTROL IN CLINICAL RESEARCH PROGRAM: ICD-10-CM

## 2024-06-22 LAB
25(OH)D3 SERPL-MCNC: 22.1 NG/ML (ref 30–100)
CHOLEST SERPL-MCNC: 177 MG/DL
EST. AVERAGE GLUCOSE BLD GHB EST-MCNC: 123 MG/DL
HBA1C MFR BLD: 5.9 %
HDLC SERPL-MCNC: 49 MG/DL
INSULIN SERPL-ACNC: 21.41 UIU/ML (ref 1.9–23)
LDLC SERPL CALC-MCNC: 108 MG/DL (ref 0–100)
NONHDLC SERPL-MCNC: 128 MG/DL
T3FREE SERPL-MCNC: 3.63 PG/ML (ref 2.5–3.9)
T4 FREE SERPL-MCNC: 0.7 NG/DL (ref 0.61–1.12)
TRIGL SERPL-MCNC: 101 MG/DL
TSH SERPL DL<=0.05 MIU/L-ACNC: 14.48 UIU/ML (ref 0.45–4.5)

## 2024-06-22 PROCEDURE — 84439 ASSAY OF FREE THYROXINE: CPT

## 2024-06-22 PROCEDURE — 83036 HEMOGLOBIN GLYCOSYLATED A1C: CPT

## 2024-06-22 PROCEDURE — 84443 ASSAY THYROID STIM HORMONE: CPT

## 2024-06-22 PROCEDURE — 36415 COLL VENOUS BLD VENIPUNCTURE: CPT

## 2024-06-22 PROCEDURE — 82306 VITAMIN D 25 HYDROXY: CPT

## 2024-06-22 PROCEDURE — 84481 FREE ASSAY (FT-3): CPT

## 2024-06-22 PROCEDURE — 80061 LIPID PANEL: CPT

## 2024-06-22 PROCEDURE — 83525 ASSAY OF INSULIN: CPT

## 2024-07-05 LAB
APOB+LDLR+PCSK9 GENE MUT ANL BLD/T: NOT DETECTED
BRCA1+BRCA2 DEL+DUP + FULL MUT ANL BLD/T: NOT DETECTED
MLH1+MSH2+MSH6+PMS2 GN DEL+DUP+FUL M: NOT DETECTED

## 2024-07-22 ENCOUNTER — CLINICAL SUPPORT (OUTPATIENT)
Dept: BARIATRICS | Facility: CLINIC | Age: 54
End: 2024-07-22

## 2024-07-22 VITALS — HEIGHT: 63 IN | WEIGHT: 293 LBS | BODY MASS INDEX: 51.91 KG/M2

## 2024-07-22 DIAGNOSIS — R63.5 ABNORMAL WEIGHT GAIN: Primary | ICD-10-CM

## 2024-07-22 PROCEDURE — RECHECK

## 2024-07-22 PROCEDURE — WMPRO12

## 2024-07-22 NOTE — PROGRESS NOTES
Weight Management Medical Nutrition Assessment    Pt presents today for Healthy Core start with current weight of 339#.  Struggled with weight her whole life.  Went through [facility] due to insurance but wasn't happy with their program because she felt they were pushing surgery.  Insurance has since changed and pt is now interested in starting Healthy Core.  Pt has struggled with her weight most of her life and has tried multiple diets. Reviewed her diet recall and she sounds to be making fairly healthy food choices but question portions. Did suggest pt eat within one hour of waking instead of waiting about 3hrs. Suggest a protein shake early and can still have her eggs at are 10am break.  Came up with meal ideas focusing on lean protien at each meal and snack and eating a little something about every 3-4hrs. Pt will start the classes tomorrow night at 6pm. All other f/u appt scheduled..      Patient seen by Medical Provider in past 6 months:  yes  Requested to schedule appointment with Medical Provider: Yes, scheduled      Anthropometric Measurements  Start Weight (#): 340#  Current Weight (#): 339#  TBW % Change from start weight:-  Ideal Body Weight (#):115#  Goal Weight (#):<200#  Highest:  Lowest:    Weight Loss History  Previous weight loss attempts: Commercial Programs (Weight Watchers, Minded, etc.)  Exercise  Self Created Diets (Portion Control, Healthy Food Choices, etc.)  Zoom and how blood sugars responded (based off of glycemic index)    Food and Nutrition Related History  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  Weekends:  out with friends some, orders veggies instead of fries and burger w/o bun  Weakness:  variety    Beverages: water and coffee with oatmilk creamer, juice/soda is very rare  Volume of beverage intake: 2L--soda stream    Weekends: Worse, a little more eating out but still trying to make better choices ie:  veggies instead of fries or burger w/o bun  Cravings: salty crunchy snacks  Trouble area of day:  at times when it is a slow time of day, boredom eating    Frequency of Eating out: 2 times per week --Friday and/or Saturday  Food restrictions: tries to avoid gluten  Cooking: self   Food Shopping: self  Lives with her  and college daughter who will be on campus starting in Sept.     Physical Activity Intake  Activity:Biking, stationary + rebounder is up to 6-7 mins per day  Frequency: 3-4 times per week  , 20-30 mins  Physical limitations/barriers to exercise: some autoimmune issues    Estimated Needs  Energy  SECA: BMR:2095      X 1.3 -1000 = 1723  [facility] Energy Needs:   BMR : 2112     1-2# loss weekly sedentary:  4108-6372               1-2# loss weekly lightly active:8366-7489  Maintenance calories for sedentary activity level: 2534  Protein:63-78gm      (1.2-1.5g/kg IBW)  Fluid: 1820ml     (35mL/kg IBW)    Nutrition Diagnosis  Yes;    Overweight/obesity  related to Excess energy intake as evidenced by  BMI more than normative standard for age and sex (obesity-grade III 40+)       Nutrition Intervention    Nutrition Prescription  Calories:4963-5834 cals  Protein:65-75gm  Fluid:1800ml    Meal Plan (Joce/Pro/Carb)  Breakfast:  200, 30g  Snack: 250, 14  Lunch:400, 21g  Snack:150-200, 0-5  Dinner:500, 28g  Snack:150, 0-5gm    Nutrition Education:    Healthy Core Manual  Calorie controlled menu  Lean protein food choices  Healthy snack options  Food journaling tips      Nutrition Counseling:  Strategies: meal planning,  portion sizes, healthy snack choices, hydration, fiber intake, protein intake, exercise, food journal      Monitoring and Evaluation:  Evaluation criteria:  Energy Intake  Meet protein needs  Maintain adequate hydration  Monitor weekly weight  Meal planning/preparation  Food journal   Decreased portions at mealtimes and snacks  Physical activity     Barriers to learning:none  Readiness to change: Preparation:  (Getting ready to change)   Comprehension: good  Expected Compliance: good

## 2024-07-23 ENCOUNTER — CLINICAL SUPPORT (OUTPATIENT)
Dept: BARIATRICS | Facility: CLINIC | Age: 54
End: 2024-07-23

## 2024-07-23 VITALS — WEIGHT: 293 LBS | HEIGHT: 63 IN | BODY MASS INDEX: 51.91 KG/M2

## 2024-07-23 DIAGNOSIS — R63.5 ABNORMAL WEIGHT GAIN: Primary | ICD-10-CM

## 2024-07-23 PROCEDURE — RECHECK

## 2024-07-30 ENCOUNTER — CLINICAL SUPPORT (OUTPATIENT)
Dept: BARIATRICS | Facility: CLINIC | Age: 54
End: 2024-07-30

## 2024-07-30 VITALS — WEIGHT: 293 LBS | HEIGHT: 63 IN | BODY MASS INDEX: 51.91 KG/M2

## 2024-07-30 DIAGNOSIS — R63.5 ABNORMAL WEIGHT GAIN: Primary | ICD-10-CM

## 2024-07-30 PROCEDURE — RECHECK

## 2024-08-06 ENCOUNTER — CLINICAL SUPPORT (OUTPATIENT)
Dept: BARIATRICS | Facility: CLINIC | Age: 54
End: 2024-08-06

## 2024-08-06 VITALS — BODY MASS INDEX: 5.92 KG/M2 | WEIGHT: 33.4 LBS | HEIGHT: 63 IN

## 2024-08-06 DIAGNOSIS — R63.5 ABNORMAL WEIGHT GAIN: Primary | ICD-10-CM

## 2024-08-06 PROCEDURE — RECHECK

## 2024-08-13 ENCOUNTER — CLINICAL SUPPORT (OUTPATIENT)
Dept: BARIATRICS | Facility: CLINIC | Age: 54
End: 2024-08-13

## 2024-08-13 VITALS — HEIGHT: 63 IN | WEIGHT: 293 LBS | BODY MASS INDEX: 51.91 KG/M2

## 2024-08-13 DIAGNOSIS — R63.5 ABNORMAL WEIGHT GAIN: Primary | ICD-10-CM

## 2024-08-13 PROCEDURE — RECHECK

## 2024-08-15 ENCOUNTER — CLINICAL SUPPORT (OUTPATIENT)
Dept: BARIATRICS | Facility: CLINIC | Age: 54
End: 2024-08-15

## 2024-08-15 VITALS — BODY MASS INDEX: 51.91 KG/M2 | HEIGHT: 63 IN | WEIGHT: 293 LBS

## 2024-08-15 DIAGNOSIS — R63.5 ABNORMAL WEIGHT GAIN: Primary | ICD-10-CM

## 2024-08-15 PROCEDURE — RECHECK

## 2024-08-15 NOTE — PROGRESS NOTES
Weight Management Medical Nutrition Assessment    Pt presents today for Healthy Core 2 week f/u with current weight of 330.6#.  She has lost 9.4# since her first visit with the provider on June 19th which is 2.7% in the past two months with most of the weight loss since she started Healthy Core about one month ago. She is averaging about 2# per week since HC start. She reports she is noticing more energy and feeling satisfied through the day. Reviewed diet recall and most days she is consuming 3107-5411 calories, most average abut 1600 cals and also hitting 85-95gm of protein per day.  Reviewed body comp with pt and provide with report. She has paid for the next month of classes therefore scheduled pt for 2nd month RD visit on Sept 17th.      Patient seen by Medical Provider in past 6 months:  yes  Requested to schedule appointment with Medical Provider: Yes, scheduled    Anthropometric Measurements  Start Weight (#): 340# (6/19/24)  Current Weight (#): 330.6#  TBW % Change from start weight: 2%  Ideal Body Weight (#):115#  Goal Weight (#):<200#    Weight Loss History  Previous weight loss attempts: Commercial Programs (Weight Watchers, Zenops, etc.)  Exercise  Self Created Diets (Portion Control, Healthy Food Choices, etc.)  Zoom and how blood sugars responded (based off of glycemic index)    Food and Nutrition Related History  Wake up: 7am --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 Felt that when she incorporated melon for some reason she didn't see a drop in her weight so cut it out.  Suggested berries for more fiber.     Snack:-  Lunch: 1pm- bagged salad with the protein (chicken) with some of the dressing  Snack:-  Dinner: 6p protein, starch, veggies (chicken and fish, not often beef)  Snack: not often  Weekends:  out with friends some, orders  veggies instead of fries and burger w/o bun  Weakness:  variety    Beverages: water and coffee with oatmilk creamer, juice/soda is very rare  Volume of beverage intake: 2L--soda stream    Weekends: Worse, a little more eating out but still trying to make better choices ie:  veggies instead of fries or burger w/o bun  Cravings: salty crunchy snacks  Trouble area of day:  at times when it is a slow time of day, boredom eating    Frequency of Eating out: 2 times per week --Friday and/or Saturday  Food restrictions: tries to avoid gluten  Cooking: self   Food Shopping: self  Lives with her  and college daughter who will be on campus starting in Sept.     Physical Activity Intake  Activity:Biking, stationary + rebounder is up to 6-7 mins per day, now up to 10 mins per day  Frequency: 3-4 times per week  , 20-30 mins  Physical limitations/barriers to exercise: some autoimmune issues    Estimated Needs  Energy  SECA: BMR:2095      X 1.3 -1000 = 1723  [facility] Energy Needs:   BMR : 2112     1-2# loss weekly sedentary:  0300-5406               1-2# loss weekly lightly active:5515-6757  Maintenance calories for sedentary activity level: 2534  Protein:63-78gm      (1.2-1.5g/kg IBW)  Fluid: 1820ml     (35mL/kg IBW)    Nutrition Diagnosis  Yes;    Overweight/obesity  related to Excess energy intake as evidenced by  BMI more than normative standard for age and sex (obesity-grade III 40+)       Nutrition Intervention    Nutrition Prescription  Calories:0888-8575 cals  Protein:65-75gm  Fluid:1800ml    Meal Plan (Joce/Pro/Carb)  Breakfast:  200, 30g  Snack: 250, 14  Lunch:400, 21g  Snack:150-200, 0-5  Dinner:500, 28g  Snack:150, 0-5gm    Nutrition Education:    Healthy Core Manual  Calorie controlled menu  Lean protein food choices  Healthy snack options  Food journaling tips      Nutrition Counseling:  Strategies: meal planning, portion sizes, healthy snack choices, hydration, fiber intake, protein intake, exercise,  food journal      Monitoring and Evaluation:  Evaluation criteria:  Energy Intake  Meet protein needs  Maintain adequate hydration  Monitor weekly weight  Meal planning/preparation  Food journal   Decreased portions at mealtimes and snacks  Physical activity     Barriers to learning:none  Readiness to change: Preparation:  (Getting ready to change)   Comprehension: good  Expected Compliance: good

## 2024-08-20 ENCOUNTER — CLINICAL SUPPORT (OUTPATIENT)
Dept: BARIATRICS | Facility: CLINIC | Age: 54
End: 2024-08-20

## 2024-08-20 VITALS — WEIGHT: 293 LBS | BODY MASS INDEX: 51.91 KG/M2 | HEIGHT: 63 IN

## 2024-08-20 DIAGNOSIS — R63.5 ABNORMAL WEIGHT GAIN: Primary | ICD-10-CM

## 2024-08-20 PROCEDURE — RECHECK

## 2024-09-03 ENCOUNTER — CLINICAL SUPPORT (OUTPATIENT)
Dept: BARIATRICS | Facility: CLINIC | Age: 54
End: 2024-09-03

## 2024-09-03 VITALS — WEIGHT: 293 LBS | BODY MASS INDEX: 51.91 KG/M2 | HEIGHT: 63 IN

## 2024-09-03 DIAGNOSIS — R63.5 ABNORMAL WEIGHT GAIN: Primary | ICD-10-CM

## 2024-09-03 PROCEDURE — RECHECK

## 2024-09-06 ENCOUNTER — CLINICAL SUPPORT (OUTPATIENT)
Dept: BARIATRICS | Facility: CLINIC | Age: 54
End: 2024-09-06

## 2024-09-06 VITALS — BODY MASS INDEX: 58 KG/M2 | WEIGHT: 293 LBS

## 2024-09-06 DIAGNOSIS — R63.5 ABNORMAL WEIGHT GAIN: Primary | ICD-10-CM

## 2024-09-06 PROCEDURE — RECHECK

## 2024-09-06 NOTE — PROGRESS NOTES
Patient presents for 1 hour Behavioral Health Evaluation as a part of Medical Weight Management Program, current weight 327.4lbs.    Eating behaviors/food choices: patient has been working with HC program, has established eating regiment, portions foods, and has worked on setting up environment for success. She reports a history of emotional eating, she has been working on reducing this by being mindful of triggers, making healthier snack choices and engaging in non-food coping skills. She enjoys crafting which combats boredom and stress. She does notice some emotional eating continues but she is choosing healthier items such as fruit. Suggested continued mindfulness on portions and assessing it's impact on weight management.    Activity/Exercise:  Patient reports engaging in purposeful activity at least 5 days per week. She owns a stationary bike and rebounder, they are set up in common area that she can use while watching TV. She sets realistic expectations about activity, tries to engage in it for a few minutes at a time. Suggested continued evaluation of activity, assess intensity, frequency and duration.    Sleep/Rest:  Patient reports getting quality rest, she goes to bed and wakes around the same time each day, denies any eating in response to fatigue. Reviewed the impact of sleep on weight management, encouraged to continue efforts to get quality sleep.    Mental Health/Wellness:  Patient reports history of emotional eating, she experiences some stress at work and with her elderly in-laws that she and her  help. These factors may contribute to schedule disruptions, may eat in response to fatigue. Patient is working on decreasing this with mindfulness, cleaning her space of tempting items and not allowing unhealthy eating to continue. Patient is practicing self kindness and patience, checking in on her mindset and feels the changes she has made are sustainable.    Next Appointment:  with FOZIA on 9/17

## 2024-09-10 ENCOUNTER — CLINICAL SUPPORT (OUTPATIENT)
Dept: BARIATRICS | Facility: CLINIC | Age: 54
End: 2024-09-10

## 2024-09-10 VITALS — HEIGHT: 63 IN | BODY MASS INDEX: 51.91 KG/M2 | WEIGHT: 293 LBS

## 2024-09-10 DIAGNOSIS — R63.5 ABNORMAL WEIGHT GAIN: Primary | ICD-10-CM

## 2024-09-10 PROCEDURE — RECHECK

## 2024-09-17 ENCOUNTER — CLINICAL SUPPORT (OUTPATIENT)
Dept: BARIATRICS | Facility: CLINIC | Age: 54
End: 2024-09-17

## 2024-09-17 VITALS — BODY MASS INDEX: 51.91 KG/M2 | HEIGHT: 63 IN | WEIGHT: 293 LBS

## 2024-09-17 VITALS — HEIGHT: 63 IN | WEIGHT: 293 LBS | BODY MASS INDEX: 51.91 KG/M2

## 2024-09-17 DIAGNOSIS — R63.5 ABNORMAL WEIGHT GAIN: Primary | ICD-10-CM

## 2024-09-17 PROCEDURE — RECHECK

## 2024-09-17 NOTE — PROGRESS NOTES
Weight Management Medical Nutrition Assessment  Pt presents today for Healthy Core 2 month  f/u with current weight of 326.4#.  She has lost 12.6# since starting Healthy Core and 13.6# since her first visit with the provider on June 19th which is 4% in the past three months.  Pt reports to be always putting protein with her foods. She has set up her kitchen for success by loading it with healthier foods and is not caving the sugar as often. She has had a lot going on over the past few weeks with more eating out taking place, but she has been able to made mindful food choices keeping her on track.  Pt will continue with the program.    Patient seen by Medical Provider in past 6 months:  yes  Requested to schedule appointment with Medical Provider: Yes, scheduled    Anthropometric Measurements  Start Weight (#): 340# (6/19/24)  Healthy Core start weight:  339#  Current Weight (#): 326.4#  TBW % Change from start weight: 4%  Ideal Body Weight (#):115#  Goal Weight (#):<200#    Weight Loss History  Previous weight loss attempts: Commercial Programs (Weight Watchers, Gill Gokul, etc.)  Exercise  Self Created Diets (Portion Control, Healthy Food Choices, etc.)  Zoom and how blood sugars responded (based off of glycemic index)    Food and Nutrition Related History  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 When at home--protein, starch, veggies (chicken and fish, not often  beef)  Snack: not often  Weekends:  out with friends some, orders veggies instead of fries and burger w/o bun  Weakness:  variety    Beverages: water and coffee with oatmilk creamer, juice/soda is very rare  Volume of beverage intake: 2L--soda stream    Weekends: Worse, a little more eating out but still trying to make better choices ie:  veggies instead of fries or burger w/o bun  Cravings: salty crunchy snacks  Trouble area of day:  at times when it is a slow time of day, boredom eating    Frequency of Eating out: 2 times per week --Friday and/or Saturday  Food restrictions: tries to avoid gluten  Cooking: self   Food Shopping: self  Lives with her  and college daughter who will be on campus starting in Sept.     Physical Activity Intake  Activity:Biking, stationary + rebounder is up to 6-7 mins per day, now up to 10 mins per day. Getting 6198-9827 steps per day but not as much of the structured exercise since taking care of MIL  Frequency: 3-4 times per week  , 20-30 mins  Physical limitations/barriers to exercise: some autoimmune issues    Estimated Needs  Energy  SECA: BMR:2095      X 1.3 -1000 = 1723  [facility] Energy Needs:   BMR : 2112     1-2# loss weekly sedentary:  8710-6162               1-2# loss weekly lightly active:9354-6033  Maintenance calories for sedentary activity level: 2534  Protein:63-78gm      (1.2-1.5g/kg IBW)  Fluid: 1820ml     (35mL/kg IBW)    Nutrition Diagnosis  Yes;    Overweight/obesity  related to Excess energy intake as evidenced by  BMI more than normative standard for age and sex (obesity-grade III 40+)       Nutrition Intervention    Nutrition Prescription  Calories:6980-7535 cals  Protein:65-75gm  Fluid:1800ml    Meal Plan (Joce/Pro/Carb)  Breakfast:  200, 30g  Snack: 250, 14  Lunch:400, 21g  Snack:150-200, 0-5  Dinner:500, 28g  Snack:150, 0-5gm    Nutrition Education:    Healthy Core Manual  Calorie controlled menu  Lean protein food choices  Healthy snack  options  Food journaling tips      Nutrition Counseling:  Strategies: meal planning, portion sizes, healthy snack choices, hydration, fiber intake, protein intake, exercise, food journal      Monitoring and Evaluation:  Evaluation criteria:  Energy Intake  Meet protein needs  Maintain adequate hydration  Monitor weekly weight  Meal planning/preparation  Food journal   Decreased portions at mealtimes and snacks  Physical activity     Barriers to learning:none  Readiness to change: Preparation:  (Getting ready to change)   Comprehension: good  Expected Compliance: good

## 2024-09-24 ENCOUNTER — OFFICE VISIT (OUTPATIENT)
Dept: BARIATRICS | Facility: CLINIC | Age: 54
End: 2024-09-24
Payer: COMMERCIAL

## 2024-09-24 ENCOUNTER — CLINICAL SUPPORT (OUTPATIENT)
Dept: BARIATRICS | Facility: CLINIC | Age: 54
End: 2024-09-24

## 2024-09-24 VITALS — HEIGHT: 63 IN | BODY MASS INDEX: 51.91 KG/M2 | WEIGHT: 293 LBS

## 2024-09-24 VITALS
HEART RATE: 81 BPM | WEIGHT: 293 LBS | BODY MASS INDEX: 51.91 KG/M2 | DIASTOLIC BLOOD PRESSURE: 90 MMHG | SYSTOLIC BLOOD PRESSURE: 124 MMHG | HEIGHT: 63 IN

## 2024-09-24 DIAGNOSIS — E66.813 CLASS 3 SEVERE OBESITY DUE TO EXCESS CALORIES WITH SERIOUS COMORBIDITY AND BODY MASS INDEX (BMI) OF 50.0 TO 59.9 IN ADULT (HCC): Primary | ICD-10-CM

## 2024-09-24 DIAGNOSIS — E66.01 CLASS 3 SEVERE OBESITY DUE TO EXCESS CALORIES WITH SERIOUS COMORBIDITY AND BODY MASS INDEX (BMI) OF 50.0 TO 59.9 IN ADULT (HCC): Primary | ICD-10-CM

## 2024-09-24 DIAGNOSIS — R63.5 ABNORMAL WEIGHT GAIN: Primary | ICD-10-CM

## 2024-09-24 PROCEDURE — RECHECK

## 2024-09-24 PROCEDURE — 99215 OFFICE O/P EST HI 40 MIN: CPT | Performed by: INTERNAL MEDICINE

## 2024-09-24 RX ORDER — TIRZEPATIDE 2.5 MG/.5ML
2.5 INJECTION, SOLUTION SUBCUTANEOUS WEEKLY
Qty: 2 ML | Refills: 0 | Status: SHIPPED | OUTPATIENT
Start: 2024-09-24 | End: 2024-11-19

## 2024-09-24 RX ORDER — LEVOTHYROXINE SODIUM 88 UG/1
88 TABLET ORAL DAILY
COMMUNITY

## 2024-09-24 NOTE — PROGRESS NOTES
Program: HealthyCORE-Intensive Lifestyle Intervention Program    Assessment/Plan     Judy Pollard  is a 54 y.o. female with  returns to follow up  for treatment of excess body weight and associated risk factors/co-morbidities.     Class 3 severe obesity due to excess calories with serious comorbidity and body mass index (BMI) of 50.0 to 59.9 in adult (HCC)  Patient has succeeded in losing about 13 pounds after starting the healthy core and along with the metformin.  She subsequently was dealing with stressors would slow down her weight loss due to erratic eating patterns.  Patient is looking into starting injectable medication to help potentiate her weight loss   -will send in prescription for Zepbound but if insurance denies will attempt Wegovy  -Patient will continue same dose of metformin    -Other options such as Qsymia and its components phentermine and topiramate were discussed (BP today 130/80 heart rate of 91); no current contraindications  -Contrave and its components were discussed-patient describes some emotional eating so this could be a good option as well-however given severe obesity may not be sufficient for patient to get to her weight loss goals  -She will continue with healthy core program and continue to track to ensure calorie deficit and adequate protein while we initiate Zepbound  Nutrition Prescription  Calories:1530-3397 cals  Protein:65-75gm  Fluid:1800ml  Patient is interested in looking into the [email or web address] fitness program reviewed details.  She will consider membership to incorporate 2 to 3 days of strength training. STOP- BANG- 3/8 ; 7  hours - feel rested, will consider sleep study in the future if necessary.  Hopefully after her stress improves with improvement in her home situation it would help her weight loss on the Zepbound       Most recent notes and records were reviewed.         Return visit:  2-3 months     Nutrition   Do not skip meals. Avoid sugary beverages. At least 64oz of  "water daily.    Behavioral/Stress   Food log via alexia or provided paper log (alexia options include www.[employer].com, sparkpeople.com, loseit.com, calorieking.com, [employer]). Encouraged mindful eating    Physical Activity  Increase physical activity by 10 minutes daily. Gradually increase physical activity to a goal of 5 days per week for 30 minutes of MODERATE intensity ( ( should be able to pass the \"talk test\" but should not be able to sing.  target 150-300 minutes  PLUS 2-3 days per week of FULL BODY resistance training. Progression will be addressed at follow up visits. Encouraged planning regarding establishing physical activity routine    Sleep  Provided sleep hygiene counseling and importance of having adequate sleep duration          Porsche was seen today for follow-up.    Diagnoses and all orders for this visit:    Class 3 severe obesity due to excess calories with serious comorbidity and body mass index (BMI) of 50.0 to 59.9 in adult (HCC)  -     tirzepatide (Zepbound) 2.5 mg/0.5 mL auto-injector; Inject 0.5 mL (2.5 mg total) under the skin once a week        Zepbound Instructions:    - Begin Zepbound 2.5 mg subcutaneously once a week. Dose changes may occur after 4 doses if medication is tolerated. You will be assessed prior to each dose change to make sure you are tolerating the medication well.  - Please message me when you have 2 pens left from the prescription so there are no lapses in treatment.  - Visit [email or web address] for further information/injection instructions.   - Take precautions to avoid dehydration. Aim for 64-80 oz of fluids/day  -Stop eating before you feel full  -Instructed  to administer a missed dose as soon as possible within 4 days. If more than 4 days have passed, skip the missed dose, administer the next dose on the regularly scheduled day, and resume the regular once-weekly dosing schedule  -Please eat small frequent meals to help reduce nausea. Avoid excessively fatty meals fried " foods. Lemon water and saltine crackers may help with this.   - Side effects of Zepbound discussed: nausea, vomiting, diarrhea, and constipation. If you experience fever, nausea/vomiting, and pain radiating to your back this may be a sign of pancreatitis. Please go to the emergency room if this occurs.  - Consider OTC bowel regimen including stool softeners, fiber supplements to regularize bowel movements while on medication. MiraLAX can be used if needed  - - If on oral birth control a 2nd method of birth control is recommended during the 1st 8 weeks of therapy and for 4 weeks after any dosage change.   - Patient understands the side effects of the medication and proper administration. Patient agrees with the treatment plan and all questions were answered.    Subcutaneous injection:  Inject subQ into the thigh, abdomen, or upper arm; rotate injection sites.  Administer at any time of day, with or without meals.  Administer separately from insulin (do not mix the products); may inject both medications in the same body region but not adjacent to each other.  The day of the weekly administration can be changed as long as the time between the 2 doses is at least 3 days (72 hours)  Administer a missed dose as soon as possible within 4 days (96 hours) of missed dose; if more than 4 days have passed, skip the missed dose and administer next dose on regularly scheduled day and resume regular once weekly dosing schedule         Total time spent reviewing chart, interviewing patient, examining patient, discussing plan, answering all questions, and documentin minutes with >50% face-to-face time with the patient.            Weight trajectory     Wt Readings from Last 20 Encounters:   24 (!) 149 kg (327 lb 12.8 oz)   24 (!) 148 kg (326 lb 8 oz)   24 (!) 148 kg (326 lb 6.4 oz)   09/10/24 (!) 149 kg (329 lb 3.2 oz)   24 (!) 149 kg (327 lb 6.4 oz)   24 (!) 151 kg (332 lb)   24 (!) 151 kg  (332 lb)   08/15/24 (!) 150 kg (330 lb 9.6 oz)   08/13/24 (!) 150 kg (331 lb 3.2 oz)   08/06/24 15.2 kg (33 lb 6.4 oz)   07/30/24 (!) 151 kg (333 lb 9.6 oz)   07/23/24 (!) 154 kg (339 lb)   07/22/24 (!) 154 kg (339 lb)   06/19/24 (!) 154 kg (340 lb)   10/07/21 (!) 145 kg (318 lb 12.8 oz)   10/01/21 (!) 145 kg (319 lb)   11/02/19 (!) 141 kg (310 lb 9.6 oz)   05/18/18 127 kg (280 lb)   04/05/17 (!) 139 kg (307 lb)   04/02/17 132 kg (290 lb)               Weight/ Lifestyle history/HPI     Patient reports   She managed to lose some weight while starting on metformin along with a healthy core but over the last 2 weeks she has had a tremendous amount of stress with her in-laws.  They have been eating late and often convenience based foods.  She reports she is cannot tell if the metformin is helping.  However with the weight loss she has had she feels like the inflammation is better.  She reports she was diagnosed with mixed connective tissue disease by a rheumatologist that left the practice and is looking for new rheumatologist    Weighing and measuring her food     Nutrition Prescription  Calories:9404-9389 cals  Protein:65-75gm  Fluid:1800ml  Will be finsihing HC in   Diet recall:  8am-Premier protein shake  Breakfast: 10am-2 eggs + occ toast, but mostly with fruit  Snack:-  Lunch: 1pm- may do more grazing--turkey and cheese roll-up + pretzels  Snack: PB and apple or pretzels and PB  Dinner: 6p Paused Hello Fresh right now, doing more eating out because taking care of MI--Bethel Iqbal in a bowl or chick ivonne-a OR salad from diner. 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     Psycho social-lives with      Gyneac (Menopausal status/periods/contraception)-full hysterectomy at age 42            Start date: 6/19/24   Intial weight : 340 lbs  Intial BMI: 60.23 kg/m2  Obesity Class: Above 40- Obesity Class III  Goal weight: 220-230 lbs     Current Weight on 9/24/2024: 327 lbs  Current BMI : 58 kg/m2 Above 40- Obesity Class III  Difference: -13 lbs     Colonoscopy: UTD  Mammogram: UTD                   Anti obesity Medications/ Medical---    Weight loss medication and dose: Metformin  Date initiated: june 2024              Objective         The following portions of the patient's history were reviewed and updated as appropriate: allergies, current medications, past family history, past medical history, past social history, past surgical history, and problem list.      /90   Pulse 81   Ht 5' 3\" (1.6 m)   Wt (!) 149 kg (327 lb 12.8 oz)   BMI 58.07 kg/m²             Review of Systems   Constitutional:  Negative for fatigue.   HENT:  Negative for sore throat.    Respiratory:  Negative for cough and shortness of breath.    Cardiovascular:  Negative for chest pain, palpitations and leg swelling.   Gastrointestinal:  Negative for abdominal pain, constipation, diarrhea and nausea.   Genitourinary:  Negative for dysuria.   Musculoskeletal:  Negative for arthralgias and back pain.   Skin:  Negative for rash.   Neurological:  Negative for headaches.   Psychiatric/Behavioral:  Negative for dysphoric mood. The patient is not nervous/anxious.          Physical Exam  Vitals and nursing note reviewed.   Constitutional:       Appearance: Normal appearance.   HENT:      Head: Normocephalic.   Pulmonary:      Effort: Pulmonary effort is normal.   Neurological:      General: No focal deficit present.      Mental " Status: She is alert and oriented to person, place, and time.   Psychiatric:         Mood and Affect: Mood normal.         Behavior: Behavior normal.         Thought Content: Thought content normal.         Judgment: Judgment normal.          Current medications       Current Outpatient Medications:     Glucosamine-Chondroitin (GLUCOSAMINE CHONDR COMPLEX PO), Take 2 tablets by mouth daily, Disp: , Rfl:     levothyroxine 88 mcg tablet, Take 88 mcg by mouth daily, Disp: , Rfl:     metFORMIN (GLUCOPHAGE-XR) 500 mg 24 hr tablet, Start with 500 mg orally once daily with the evening meal increase in two weeks to 1000 mg (two tablets) if tolerated, Disp: 60 tablet, Rfl: 3    multivitamin (THERAGRAN) TABS, Take 1 tablet by mouth daily, Disp: , Rfl:     TART CHERRY PO, Take 2 capsules by mouth daily, Disp: , Rfl:     tirzepatide (Zepbound) 2.5 mg/0.5 mL auto-injector, Inject 0.5 mL (2.5 mg total) under the skin once a week, Disp: 2 mL, Rfl: 0    Turmeric 400 MG CAPS, Take 2 capsules by mouth daily, Disp: , Rfl:          Medication considerations/contraindications     -Patient denies personal history of pancreatitis. Patient also denies personal and family history of medullary thyroid cancer, and multiple endocrine neoplasia type 2 (MEN 2 tumor). -Patient denies any history of kidney stones, seizures, or glaucoma, diabetic retinopathy, gall bladder disease, gastroparesis, hyperthyroidism.  -Denies Hx of CAD, PAD, palpitations, arrhythmia, uncontrolled hypertension  -Denies uncontrolled anxiety or depression, suicidal behavior or thinking , insomnia or sleep disturbance.         Labs     Most recent labs reviewed   Lab Results   Component Value Date     01/01/2016    SODIUM 143 10/09/2023    K 4.4 10/09/2023     10/09/2023    CO2 28 10/09/2023    ANIONGAP 5 01/01/2016    AGAP 8 10/09/2023    BUN 10 10/09/2023    CREATININE 0.91 10/09/2023    GLUC 106 (H) 10/09/2023    CALCIUM 9.3 10/09/2023    AST 20 10/09/2023     "ALT 22 10/09/2023    ALKPHOS 68 10/09/2023    PROT 6.9 12/26/2015    TP 6.7 10/09/2023    BILITOT 0.95 12/26/2015    TBILI 0.9 10/09/2023    EGFR 76 10/09/2023     Lab Results   Component Value Date    HGBA1C 5.9 (H) 06/22/2024     Lab Results   Component Value Date    AUL8CMEMNPLU 14.485 (H) 06/22/2024    TSH 14.79 (H) 10/09/2023     Lab Results   Component Value Date    CHOLESTEROL 177 06/22/2024     Lab Results   Component Value Date    HDL 49 (L) 06/22/2024     Lab Results   Component Value Date    TRIG 101 06/22/2024     Lab Results   Component Value Date    LDLCALC 108 (H) 06/22/2024     No results found for: \"VITD\"  No components found for: \"FASTINS\"   "

## 2024-09-24 NOTE — ASSESSMENT & PLAN NOTE
Patient has succeeded in losing about 13 pounds after starting the healthy core and along with the metformin.  She subsequently was dealing with stressors would slow down her weight loss due to erratic eating patterns.  Patient is looking into starting injectable medication to help potentiate her weight loss   -will send in prescription for Zepbound but if insurance denies will attempt Wegovy  -Patient will continue same dose of metformin    -Other options such as Qsymia and its components phentermine and topiramate were discussed (BP today 130/80 heart rate of 91); no current contraindications  -Contrave and its components were discussed-patient describes some emotional eating so this could be a good option as well-however given severe obesity may not be sufficient for patient to get to her weight loss goals  -She will continue with healthy core program and continue to track to ensure calorie deficit and adequate protein while we initiate Zepbound  Nutrition Prescription  Calories:5402-3075 cals  Protein:65-75gm  Fluid:1800ml  Patient is interested in looking into the [email or web address] fitness program reviewed details.  She will consider membership to incorporate 2 to 3 days of strength training. STOP- BANG- 3/8 ; 7  hours - feel rested, will consider sleep study in the future if necessary.  Hopefully after her stress improves with improvement in her home situation it would help her weight loss on the Zepbound

## 2024-09-25 ENCOUNTER — TELEPHONE (OUTPATIENT)
Dept: BARIATRICS | Facility: CLINIC | Age: 54
End: 2024-09-25

## 2024-09-25 NOTE — TELEPHONE ENCOUNTER
Zepbound Approved 9/25/24-3/25/25.  Pharmacy was notified, but they don't have medication in stock.  Patient co-pay $24.98 with an applied discount code.  Patient notified via [employer].

## 2024-10-01 ENCOUNTER — CLINICAL SUPPORT (OUTPATIENT)
Dept: BARIATRICS | Facility: CLINIC | Age: 54
End: 2024-10-01

## 2024-10-01 DIAGNOSIS — R63.5 ABNORMAL WEIGHT GAIN: Primary | ICD-10-CM

## 2024-10-01 PROCEDURE — RECHECK

## 2024-10-15 ENCOUNTER — CLINICAL SUPPORT (OUTPATIENT)
Dept: BARIATRICS | Facility: CLINIC | Age: 54
End: 2024-10-15

## 2024-10-15 VITALS — BODY MASS INDEX: 51.91 KG/M2 | WEIGHT: 293 LBS | HEIGHT: 63 IN

## 2024-10-15 DIAGNOSIS — R63.5 ABNORMAL WEIGHT GAIN: Primary | ICD-10-CM

## 2024-10-15 PROCEDURE — RECHECK

## 2024-10-21 ENCOUNTER — CLINICAL SUPPORT (OUTPATIENT)
Dept: BARIATRICS | Facility: CLINIC | Age: 54
End: 2024-10-21

## 2024-10-21 VITALS — BODY MASS INDEX: 51.91 KG/M2 | HEIGHT: 63 IN | WEIGHT: 293 LBS

## 2024-10-21 DIAGNOSIS — R63.5 ABNORMAL WEIGHT GAIN: Primary | ICD-10-CM

## 2024-10-21 PROCEDURE — RECHECK

## 2024-10-21 NOTE — PROGRESS NOTES
Weight Management Medical Nutrition Assessment  Pt presents today for Healthy Core 3 month  f/u with current weight of 325.2#.  She has lost 13.8# since starting Healthy Core and 14.8# since her first visit with the provider on June 19th which is 4% in the past three months.  This past month was hectic with still dealing with her in-law's health.  She noted she was up in class last week and realzied she needed to start being more mindful again. She set boundries to avoid eating very late and night and got back to logging to keep her accountable.  Pt is happy iwht her progress. Did complete and review her REE and repeat body comp. Her REE was normal as 2117 and she has lost 10# of fat mass of there total 13.8# she last during healthy core. Advised continued wt loss calorie rage around 1700 cals.  Pt was happy with her results.  Reviewed next step option Healthy Ways vs bundles. Pt has one more class that she will have to move until next week and will decide at that time.     Patient seen by Medical Provider in past 6 months:  yes  Requested to schedule appointment with Medical Provider: Yes, scheduled    Anthropometric Measurements  Start Weight (#): 340# (6/19/24)  Healthy Core start weight:  339#  Current Weight (#): 325.2#  TBW % Change from start weight: 4%  Ideal Body Weight (#):115#  Goal Weight (#):<200#    Weight Loss History  Previous weight loss attempts: Commercial Programs (Weight Watchers, Gill Gokul, etc.)  Exercise  Self Created Diets (Portion Control, Healthy Food Choices, etc.)  Zoom and how blood sugars responded (based off of glycemic index)    Food and Nutrition Related History  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   Snack:-  Lunch: 1pm-turkey and cheese roll-up + pretzels OR salad or turkey sandwich or tuna salad  Snack: PB and apple or pretzels and PB (measured) OR Protein bar if later dinner  Dinner: 6p Paused Hello Fresh right now, doing more eating out because taking care of MI--Bethel Iqbalamberly in a bowl or chick ivonne-a OR salad from diner. When at home--protein, starch, veggies (chicken and fish, not often beef)  Snack:  protein bar if earlier dinner  Weekends:  out with friends some, orders veggies instead of fries and burger w/o bun  Weakness:  variety    Beverages: water and coffee with oatmilk creamer, juice/soda is very rare  Volume of beverage intake: 2L--soda stream    Weekends: Worse, a little more eating out but still trying to make better choices ie:  veggies instead of fries or burger w/o bun  Cravings: salty crunchy snacks  Trouble area of day:  at times when it is a slow time of day, boredom eating    Frequency of Eating out: 2 times per week --Friday and/or Saturday  Food restrictions: tries to avoid gluten  Cooking: self   Food Shopping: self  Lives with her  and college daughter who will be on campus starting in Sept.     Physical Activity Intake  Activity:Biking, stationary + rebounder is up to 6-7 mins per day, now up to 10 mins per day. Getting 7757-0673 steps per day but not as much of the structured exercise since taking care of MIL  Frequency: 3-4 times per week  , 20-30 mins  Physical limitations/barriers to exercise: some autoimmune issues    Estimated Needs  Energy  SECA: BMR:2095      X 1.3 -1000 = 1723  Repeat SECA BMR:  2045 x 1.3 = 1658 cals  REE:  2117, suggested wt loss range:  1700 cals    [place] [address] [facility] Energy Needs: (325.2#)  BMR : 2049     1-2# loss weekly sedentary:  6559-8795              1-2# loss weekly lightly active:818-2318  Maintenance calories for sedentary activity  level: 2459  Protein:63-78gm      (1.2-1.5g/kg IBW)  Fluid: 1820ml     (35mL/kg IBW)    Nutrition Diagnosis  Yes;    Overweight/obesity  related to Excess energy intake as evidenced by  BMI more than normative standard for age and sex (obesity-grade III 40+)       Nutrition Intervention    Nutrition Prescription  Calories:9120-7760 cals  Protein:65-75gm  Fluid:1800ml    Meal Plan (Joce/Pro/Carb)  Breakfast:  200, 30g  Snack: 250, 14  Lunch:400, 21g  Snack:150-200, 0-5  Dinner:500, 28g  Snack:150, 0-5gm    Nutrition Education:    Healthy Core Manual  Calorie controlled menu  Lean protein food choices  Healthy snack options  Food journaling tips      Nutrition Counseling:  Strategies: meal planning, portion sizes, healthy snack choices, hydration, fiber intake, protein intake, exercise, food journal      Monitoring and Evaluation:  Evaluation criteria:  Energy Intake  Meet protein needs  Maintain adequate hydration  Monitor weekly weight  Meal planning/preparation  Food journal   Decreased portions at mealtimes and snacks  Physical activity     Barriers to learning:none  Readiness to change: Preparation:  (Getting ready to change)   Comprehension: good  Expected Compliance: good

## 2024-10-29 ENCOUNTER — CLINICAL SUPPORT (OUTPATIENT)
Dept: BARIATRICS | Facility: CLINIC | Age: 54
End: 2024-10-29

## 2024-10-29 VITALS — WEIGHT: 293 LBS | HEIGHT: 63 IN | BODY MASS INDEX: 51.91 KG/M2

## 2024-10-29 DIAGNOSIS — R63.5 ABNORMAL WEIGHT GAIN: Primary | ICD-10-CM

## 2024-10-29 PROCEDURE — RECHECK

## 2024-12-23 DIAGNOSIS — E66.01 CLASS 3 SEVERE OBESITY DUE TO EXCESS CALORIES WITH BODY MASS INDEX (BMI) OF 60.0 TO 69.9 IN ADULT, UNSPECIFIED WHETHER SERIOUS COMORBIDITY PRESENT (HCC): ICD-10-CM

## 2024-12-23 DIAGNOSIS — E66.813 CLASS 3 SEVERE OBESITY DUE TO EXCESS CALORIES WITH BODY MASS INDEX (BMI) OF 60.0 TO 69.9 IN ADULT, UNSPECIFIED WHETHER SERIOUS COMORBIDITY PRESENT (HCC): ICD-10-CM

## 2024-12-23 RX ORDER — METFORMIN HYDROCHLORIDE 500 MG/1
TABLET, EXTENDED RELEASE ORAL
Qty: 60 TABLET | Refills: 3 | Status: CANCELLED | OUTPATIENT
Start: 2024-12-23

## 2024-12-24 DIAGNOSIS — E66.01 CLASS 3 SEVERE OBESITY DUE TO EXCESS CALORIES WITH SERIOUS COMORBIDITY AND BODY MASS INDEX (BMI) OF 50.0 TO 59.9 IN ADULT (HCC): Primary | ICD-10-CM

## 2024-12-24 DIAGNOSIS — E66.813 CLASS 3 SEVERE OBESITY DUE TO EXCESS CALORIES WITH SERIOUS COMORBIDITY AND BODY MASS INDEX (BMI) OF 50.0 TO 59.9 IN ADULT (HCC): Primary | ICD-10-CM

## 2024-12-24 RX ORDER — TIRZEPATIDE 5 MG/.5ML
5 INJECTION, SOLUTION SUBCUTANEOUS WEEKLY
Qty: 2 ML | Refills: 2 | Status: SHIPPED | OUTPATIENT
Start: 2024-12-24 | End: 2025-03-18

## 2024-12-25 DIAGNOSIS — E66.01 CLASS 3 SEVERE OBESITY DUE TO EXCESS CALORIES WITH BODY MASS INDEX (BMI) OF 60.0 TO 69.9 IN ADULT, UNSPECIFIED WHETHER SERIOUS COMORBIDITY PRESENT (HCC): ICD-10-CM

## 2024-12-25 DIAGNOSIS — E66.813 CLASS 3 SEVERE OBESITY DUE TO EXCESS CALORIES WITH BODY MASS INDEX (BMI) OF 60.0 TO 69.9 IN ADULT, UNSPECIFIED WHETHER SERIOUS COMORBIDITY PRESENT (HCC): ICD-10-CM

## 2024-12-25 RX ORDER — METFORMIN HYDROCHLORIDE 500 MG/1
TABLET, EXTENDED RELEASE ORAL
Qty: 180 TABLET | Refills: 3 | Status: SHIPPED | OUTPATIENT
Start: 2024-12-25

## 2025-01-06 ENCOUNTER — OFFICE VISIT (OUTPATIENT)
Dept: BARIATRICS | Facility: CLINIC | Age: 55
End: 2025-01-06
Payer: COMMERCIAL

## 2025-01-06 VITALS
OXYGEN SATURATION: 99 % | DIASTOLIC BLOOD PRESSURE: 80 MMHG | HEART RATE: 84 BPM | HEIGHT: 63 IN | WEIGHT: 293 LBS | BODY MASS INDEX: 51.91 KG/M2 | SYSTOLIC BLOOD PRESSURE: 126 MMHG

## 2025-01-06 DIAGNOSIS — E66.01 CLASS 3 SEVERE OBESITY DUE TO EXCESS CALORIES WITH SERIOUS COMORBIDITY AND BODY MASS INDEX (BMI) OF 50.0 TO 59.9 IN ADULT (HCC): ICD-10-CM

## 2025-01-06 DIAGNOSIS — E66.813 CLASS 3 SEVERE OBESITY DUE TO EXCESS CALORIES WITH SERIOUS COMORBIDITY AND BODY MASS INDEX (BMI) OF 50.0 TO 59.9 IN ADULT (HCC): ICD-10-CM

## 2025-01-06 PROCEDURE — 99215 OFFICE O/P EST HI 40 MIN: CPT | Performed by: INTERNAL MEDICINE

## 2025-01-06 NOTE — ASSESSMENT & PLAN NOTE
Nutrition:  Nutrition Prescription  Calories:4886-9816 cals  Protein:65-75gm  Fluid:1800ml    Would like to continue healthy ways as she finished healthy core 2 months ago  -Patient reports she is staying in a calorie deficit and meeting her protein goals for the most part.  She tracks using my fitness pal.  Her eating has been a little erratic with a stressful  situation surrounding her mother-in-law however she tries to meal prep and stay protein focused so she can take healthy foods on the road    Physical Activity: weight progression and strength training.  Patient will borrow dumbbells from daughter.  Patient states that she has some downtime during her workday.  She has a stationary bike encouraged her to incorporate 10 to 15-minute bouts 2-3 times a day when feasible    Sleep: - STOP- BANG- 3/8 ; 7  hours - feel rested,     Behavioral/Stress: Continue tracking current intake using MFP, so adjustments can be made by the dietitian.  -Stressful situation with getting her mother-in-law to an assisted living facility now better    Antiobesity Medications/Medical --patient has finished 4 doses of the 2.5 mg.  She lost about 13 pounds on metformin and succeeded in losing another 10 pounds with the starting dose of Zepbound.  She will start the 5 mg dose and continue on that until next office visit.    -Patient will continue same dose of metformin     -Other options such as Qsymia and its components phentermine and topiramate were discussed no current contraindications  -Contrave and its components were discussed-patient describes some emotional eating so this could be a good option as well-however given severe obesity may not be sufficient for patient to get to her weight loss goals.  Could be considered in combination    -Patient was considered for the [facility] bariatric surgery program 1.5 years ago and had attended initial classes; patient decided against doing bariatric surgery and would like to do medical  weight loss

## 2025-01-06 NOTE — PROGRESS NOTES
Program: Conservative Program    Assessment/Plan     Judy Pollard  is a 54 y.o. female with  returns to follow up  for treatment of excess body weight and associated risk factors/co-morbidities.     Class 3 severe obesity due to excess calories with serious comorbidity and body mass index (BMI) of 50.0 to 59.9 in adult ([facility])  Nutrition:  Nutrition Prescription  Calories:9788-4173 cals  Protein:65-75gm  Fluid:1800ml    Would like to continue healthy ways as she finished healthy core 2 months ago  -Patient reports she is staying in a calorie deficit and meeting her protein goals for the most part.  She tracks using my fitness pal.  Her eating has been a little erratic with a stressful  situation surrounding her mother-in-law however she tries to meal prep and stay protein focused so she can take healthy foods on the road    Physical Activity: weight progression and strength training.  Patient will borrow dumbbells from daughter.  Patient states that she has some downtime during her workday.  She has a stationary bike encouraged her to incorporate 10 to 15-minute bouts 2-3 times a day when feasible    Sleep: - STOP- BANG- 3/8 ; 7  hours - feel rested,     Behavioral/Stress: Continue tracking current intake using MFP, so adjustments can be made by the dietitian.  -Stressful situation with getting her mother-in-law to an assisted living facility now better    Antiobesity Medications/Medical --patient has finished 4 doses of the 2.5 mg.  She lost about 13 pounds on metformin and succeeded in losing another 10 pounds with the starting dose of Zepbound.  She will start the 5 mg dose and continue on that until next office visit.    -Patient will continue same dose of metformin     -Other options such as Qsymia and its components phentermine and topiramate were discussed no current contraindications  -Contrave and its components were discussed-patient describes some emotional eating so this could be a good option as  "well-however given severe obesity may not be sufficient for patient to get to her weight loss goals.  Could be considered in combination    -Patient was considered for the [facility] bariatric surgery program 1.5 years ago and had attended initial classes; patient decided against doing bariatric surgery and would like to do medical weight loss          Most recent notes and records were reviewed.         Return visit:  2-3 months     Nutrition   Do not skip meals. Avoid sugary beverages. At least 64oz of water daily.    Behavioral/Stress   Food log via alexia or provided paper log (alexia options include www.myfitnesspal.com, sparkpeople.com, loseit.com, calorieking.com, YellowSchedule). Encouraged mindful eating    Physical Activity  Increase physical activity by 10 minutes daily. Gradually increase physical activity to a goal of 5 days per week for 30 minutes of MODERATE intensity ( ( should be able to pass the \"talk test\" but should not be able to sing.  target 150-300 minutes  PLUS 2-3 days per week of FULL BODY resistance training. Progression will be addressed at follow up visits. Encouraged planning regarding establishing physical activity routine    Sleep  Provided sleep hygiene counseling and importance of having adequate sleep duration          Porsche was seen today for follow-up.    Diagnoses and all orders for this visit:    Class 3 severe obesity due to excess calories with serious comorbidity and body mass index (BMI) of 50.0 to 59.9 in adult (HCC)                Total time spent reviewing chart, interviewing patient, examining patient, discussing plan, answering all questions, and documentin minutes with >50% face-to-face time with the patient.            Weight trajectory     Wt Readings from Last 20 Encounters:   25 (!) 144 kg (317 lb 6.4 oz)   10/29/24 (!) 148 kg (326 lb 11.2 oz)   10/21/24 (!) 148 kg (325 lb 3.2 oz)   10/15/24 (!) 149 kg (327 lb 11.2 oz)   24 (!) 149 kg (328 lb 8 oz) "   09/24/24 (!) 149 kg (327 lb 12.8 oz)   09/17/24 (!) 148 kg (326 lb 8 oz)   09/17/24 (!) 148 kg (326 lb 6.4 oz)   09/10/24 (!) 149 kg (329 lb 3.2 oz)   09/06/24 (!) 149 kg (327 lb 6.4 oz)   09/03/24 (!) 151 kg (332 lb)   08/20/24 (!) 151 kg (332 lb)   08/15/24 (!) 150 kg (330 lb 9.6 oz)   08/13/24 (!) 150 kg (331 lb 3.2 oz)   08/06/24 15.2 kg (33 lb 6.4 oz)   07/30/24 (!) 151 kg (333 lb 9.6 oz)   07/23/24 (!) 154 kg (339 lb)   07/22/24 (!) 154 kg (339 lb)   06/19/24 (!) 154 kg (340 lb)   10/07/21 (!) 145 kg (318 lb 12.8 oz)             Lifestyle questionnaire     Nutrition Prescription  Calories:3443-3145 cals  Protein:65-75gm  Fluid:1800ml    Diet recall:  8am-Premier protein shake  8am-Premier protein shake or eggs + fruit/low shahzad toast  10am-2 eggs + low shahzad toast, but mostly with fruit or protein shake if didn't have for breakfast.   Snack:-  Lunch: 1pm-turkey and cheese roll-up + pretzels OR salad or turkey sandwich or tuna salad  Snack: PB and apple or pretzels and PB (measured) OR Protein bar if later dinner  Dinner: 6p Paused Hello Fresh right now, doing more eating out because taking care of MI--Bethel Iqbal in a bowl or chick ivonne-a OR salad from diner. 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, [employer], 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     Psycho social-lives with      Gyneac (Menopausal status/periods/contraception)-full hysterectomy at age 42            Start date:  "6/19/24   Intial weight : 340 lbs  Intial BMI: 60.23 kg/m2  Obesity Class: Above 40- Obesity Class III  Goal weight: 220-230 lbs     Current Weight on 9/24/2024: 327 lbs  Current BMI : 58 kg/m2 Above 40- Obesity Class III  Difference: -13 lbs     Colonoscopy: UTD  Mammogram: UTD                   Anti obesity Medications/ Medical---    Weight loss medication and dose: Metformin  Date initiated: june 2024      Colonoscopy: Not on file   Mammogram: 09/07/2023      Weight on 1/6/2025 : 317 lbs(-10)  BMI on 1/6/2025  : 56.2 kg/m2 Above 40- Obesity Class III  Difference: -23 lbs      Anti obesity Medications/ Medical---    Weight loss medication and dose: Zepbound   Date initiated: Dec 2024              Objective         The following portions of the patient's history were reviewed and updated as appropriate: allergies, current medications, past family history, past medical history, past social history, past surgical history, and problem list.      /80 (BP Location: Left arm, Patient Position: Sitting, Cuff Size: Large)   Pulse 84   Ht 5' 3\" (1.6 m)   Wt (!) 144 kg (317 lb 6.4 oz)   SpO2 99%   BMI 56.22 kg/m²             Review of Systems   Constitutional:  Negative for fatigue.   HENT:  Negative for sore throat.    Respiratory:  Negative for cough and shortness of breath.    Cardiovascular:  Negative for chest pain, palpitations and leg swelling.   Gastrointestinal:  Negative for abdominal pain, constipation, diarrhea and nausea.   Genitourinary:  Negative for dysuria.   Musculoskeletal:  Negative for arthralgias and back pain.   Skin:  Negative for rash.   Neurological:  Negative for headaches.   Psychiatric/Behavioral:  Negative for dysphoric mood. The patient is not nervous/anxious.          Physical Exam  Vitals and nursing note reviewed.   Constitutional:       Appearance: Normal appearance.   HENT:      Head: Normocephalic.   Pulmonary:      Effort: Pulmonary effort is normal.   Neurological:      " General: No focal deficit present.      Mental Status: She is alert and oriented to person, place, and time.   Psychiatric:         Mood and Affect: Mood normal.         Behavior: Behavior normal.         Thought Content: Thought content normal.         Judgment: Judgment normal.          Current medications       Current Outpatient Medications:     Glucosamine-Chondroitin (GLUCOSAMINE CHONDR COMPLEX PO), Take 2 tablets by mouth daily, Disp: , Rfl:     levothyroxine 88 mcg tablet, Take 88 mcg by mouth daily, Disp: , Rfl:     metFORMIN (GLUCOPHAGE-XR) 500 mg 24 hr tablet, Start with 500 mg orally once daily with the evening meal increase in two weeks to 1000 mg (two tablets) if tolerated, Disp: 180 tablet, Rfl: 3    multivitamin (THERAGRAN) TABS, Take 1 tablet by mouth daily, Disp: , Rfl:     TART CHERRY PO, Take 2 capsules by mouth daily, Disp: , Rfl:     tirzepatide (Zepbound) 5 mg/0.5 mL auto-injector, Inject 0.5 mL (5 mg total) under the skin once a week, Disp: 2 mL, Rfl: 2    Turmeric 400 MG CAPS, Take 2 capsules by mouth daily, Disp: , Rfl:          Medication considerations/contraindications     -Patient denies personal history of pancreatitis. Patient also denies personal and family history of medullary thyroid cancer, and multiple endocrine neoplasia type 2 (MEN 2 tumor). -Patient denies any history of kidney stones, seizures, or glaucoma, diabetic retinopathy, gall bladder disease, gastroparesis, hyperthyroidism.  -Denies Hx of CAD, PAD, palpitations, arrhythmia, uncontrolled hypertension  -Denies uncontrolled anxiety or depression, suicidal behavior or thinking , insomnia or sleep disturbance.         Labs     Most recent labs reviewed   Lab Results   Component Value Date     01/01/2016    SODIUM 143 10/09/2023    K 4.4 10/09/2023     10/09/2023    CO2 28 10/09/2023    ANIONGAP 5 01/01/2016    AGAP 8 10/09/2023    BUN 10 10/09/2023    CREATININE 0.91 10/09/2023    GLUC 106 (H) 10/09/2023     CALCIUM 9.3 10/09/2023    AST 20 10/09/2023    ALT 22 10/09/2023    ALKPHOS 68 10/09/2023    PROT 6.9 12/26/2015    TP 6.7 10/09/2023    BILITOT 0.95 12/26/2015    TBILI 0.9 10/09/2023    EGFR 76 10/09/2023     Lab Results   Component Value Date    HGBA1C 5.9 (H) 06/22/2024     Lab Results   Component Value Date    NPL6SONAYLPY 14.485 (H) 06/22/2024    TSH 14.79 (H) 10/09/2023     Lab Results   Component Value Date    CHOLESTEROL 177 06/22/2024     Lab Results   Component Value Date    HDL 49 (L) 06/22/2024     Lab Results   Component Value Date    TRIG 101 06/22/2024     Lab Results   Component Value Date    LDLCALC 108 (H) 06/22/2024     Vit D, 25-Hydroxy   Date Value Ref Range Status   06/22/2024 22.1 (L) 30.0 - 100.0 ng/mL Final     Comment:     Vitamin D guidelines established by Clinical Guidelines Subcommittee  of the Endocrine Society Task Force, 2011    Deficiency <20ng/ml   Insufficiency 20-30ng/ml   Sufficient  ng/ml      Insulin, Fasting   Date Value Ref Range Status   06/22/2024 21.41 1.90 - 23.00 uIU/mL Final

## 2025-01-07 ENCOUNTER — TELEPHONE (OUTPATIENT)
Dept: BARIATRICS | Facility: CLINIC | Age: 55
End: 2025-01-07

## 2025-01-07 NOTE — TELEPHONE ENCOUNTER
Called patient to set up the 3mo f/u appt. And start of HW program no answer Lvm to call the office.

## 2025-01-09 ENCOUNTER — TELEPHONE (OUTPATIENT)
Age: 55
End: 2025-01-09

## 2025-01-09 NOTE — TELEPHONE ENCOUNTER
Pt called to schedule MWM FU appt . Unable to warm transfer for scheduling assistance. Pt said that she would call back tomorrow since she is at work

## 2025-02-25 ENCOUNTER — TELEPHONE (OUTPATIENT)
Age: 55
End: 2025-02-25

## 2025-02-25 NOTE — TELEPHONE ENCOUNTER
Patient called in because she was last seen on 1/6/25 and needs to make a 3 month follow up. Patient can be reached at [phone]

## 2025-02-27 ENCOUNTER — TELEPHONE (OUTPATIENT)
Dept: BARIATRICS | Facility: CLINIC | Age: 55
End: 2025-02-27

## 2025-02-27 NOTE — TELEPHONE ENCOUNTER
Called left voice mail for pt. Pt needs to schedule a nurse visit for PA renewal we need an updated in person weight check.

## 2025-03-05 NOTE — TELEPHONE ENCOUNTER
Called left voice mail for pt to called the office. Pt needs a nurse visit weight check. For prior authorization

## 2025-03-06 ENCOUNTER — CLINICAL SUPPORT (OUTPATIENT)
Dept: BARIATRICS | Facility: CLINIC | Age: 55
End: 2025-03-06

## 2025-03-06 VITALS
DIASTOLIC BLOOD PRESSURE: 80 MMHG | SYSTOLIC BLOOD PRESSURE: 136 MMHG | OXYGEN SATURATION: 99 % | HEIGHT: 63 IN | HEART RATE: 83 BPM | BODY MASS INDEX: 51.91 KG/M2 | WEIGHT: 293 LBS

## 2025-03-06 DIAGNOSIS — E66.813 CLASS 3 SEVERE OBESITY DUE TO EXCESS CALORIES WITH SERIOUS COMORBIDITY AND BODY MASS INDEX (BMI) OF 50.0 TO 59.9 IN ADULT (HCC): Primary | ICD-10-CM

## 2025-03-06 DIAGNOSIS — E66.01 CLASS 3 SEVERE OBESITY DUE TO EXCESS CALORIES WITH SERIOUS COMORBIDITY AND BODY MASS INDEX (BMI) OF 50.0 TO 59.9 IN ADULT (HCC): Primary | ICD-10-CM

## 2025-03-06 NOTE — PROGRESS NOTES
Vitals:    03/06/25 0821   Weight: (!) 140 kg (307 lb 12.8 oz)    Pt is taking zepbound 5mg tolerating without any side effects

## 2025-03-18 ENCOUNTER — CLINICAL SUPPORT (OUTPATIENT)
Dept: BARIATRICS | Facility: CLINIC | Age: 55
End: 2025-03-18

## 2025-03-18 VITALS — HEIGHT: 63 IN | WEIGHT: 293 LBS | BODY MASS INDEX: 51.91 KG/M2

## 2025-03-18 DIAGNOSIS — R63.5 ABNORMAL WEIGHT GAIN: Primary | ICD-10-CM

## 2025-03-18 PROCEDURE — RECHECK

## 2025-03-24 DIAGNOSIS — E66.813 CLASS 3 SEVERE OBESITY DUE TO EXCESS CALORIES WITH SERIOUS COMORBIDITY AND BODY MASS INDEX (BMI) OF 50.0 TO 59.9 IN ADULT (HCC): Primary | ICD-10-CM

## 2025-03-24 DIAGNOSIS — E66.01 CLASS 3 SEVERE OBESITY DUE TO EXCESS CALORIES WITH SERIOUS COMORBIDITY AND BODY MASS INDEX (BMI) OF 50.0 TO 59.9 IN ADULT (HCC): Primary | ICD-10-CM

## 2025-03-24 RX ORDER — TIRZEPATIDE 5 MG/.5ML
5 INJECTION, SOLUTION SUBCUTANEOUS WEEKLY
Qty: 2 ML | Refills: 0 | Status: SHIPPED | OUTPATIENT
Start: 2025-03-24 | End: 2025-04-21

## 2025-04-01 ENCOUNTER — CLINICAL SUPPORT (OUTPATIENT)
Dept: BARIATRICS | Facility: CLINIC | Age: 55
End: 2025-04-01

## 2025-04-01 VITALS — HEIGHT: 63 IN | BODY MASS INDEX: 51.91 KG/M2 | WEIGHT: 293 LBS

## 2025-04-01 DIAGNOSIS — R63.5 ABNORMAL WEIGHT GAIN: Primary | ICD-10-CM

## 2025-04-01 PROCEDURE — RECHECK

## 2025-04-01 NOTE — PROGRESS NOTES
Weight Management Medical Nutrition Assessment  Pt presents today for Healthy Ways (bi-weekly class) RD visit with current weight of 306.4#.  Pt has lost 32.6# overall weight loss (10%TBW). She has been traveling more lately to [place] to visit her diet to visit her new grandbaby.  The snack bag she now takes in the car has changed completely. She is bringing more fruit, boiled eggs, protein shakes and cheese sticks instead of twizzlers and M&Ms.  She is finished the Healthy Core program in Oct and since has been busy has recently decided to start the Healthy [employer] program. She has decided to do the Bi-weekly program. Pt would like to attend the April 15th class and will f/u with RD on May 6th. She continues to do well with her food choices, but isn't logging as often. Reestimated her calories since she has lost about 20# since last RD visit. Suggested to decrease current intake by 100 cals therefore between 8161-1798 cals per day.  Pt will get back to logging to best determine her intake.      Patient seen by Medical Provider in past 6 months:  yes  Requested to schedule appointment with Medical Provider: Yes, scheduled    Anthropometric Measurements  Start Weight (#): 340# (6/19/24)  Healthy Core start weight:  339#  Current Weight (#): 306.4#  TBW % Change from start weight: 10%  Ideal Body Weight (#):115#  Goal Weight (#):<200#    Weight Loss History  Previous weight loss attempts: Commercial Programs (Weight Watchers, [employer], etc.)  Exercise  Self Created Diets (Portion Control, Healthy Food Choices, etc.)  Zoom and how blood sugars responded (based off of glycemic index)    Food and Nutrition Related History  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  Snack:  "2:30pm-PB and apple or string cheese (if skips this snack started \"looking\" for food at 4:30 and it is mindless)  Dinner: 6:30p  When at home--protein, 1/4 to 1/2 cup starch, a large amt of veggies (chicken and fish, not often beef)  Snack:  sometimes will have nonfat milk w/ 100 shahzad pack of cookies crisps--3x/week or nothing    Beverages: water and coffee with oatmilk creamer, juice/soda is very rare  Volume of beverage intake: 2L--soda stream    Weekends: Worse, a little more eating out but still trying to make better choices ie:  veggies instead of fries or burger w/o bun  Cravings: salty crunchy snacks  Trouble area of day:  at times when it is a slow time of day, boredom eating    Frequency of Eating out: 2 times per week --Friday and/or Saturday  Food restrictions: tries to avoid gluten  Cooking: self   Food Shopping: self  Lives with her .  Has grown kids out of the house.     Physical Activity Intake  Activity:  a little harder lately because traveling to [place] to visit daughter and grandson.  Trying to move more, but not as much of a regular regimen.  Biking, stationary + rebounder.  Getting 7915-7287 steps per day but not as much of the structured exercise   Frequency: 3-4 times per week  , 20-30 mins  Physical limitations/barriers to exercise: some autoimmune issues    Estimated Needs  Energy  SECA: BMR:2095      X 1.3 -1000 = 1723  Repeat SECA BMR:  2045 x 1.3 = 1658 cals  REE:  2117, suggested wt loss range:  1700 cals (from  final visit)    [employer]rist[employer] Needs: (306# on 4/1/25)  BMR : 1962  1-2# loss weekly sedentary:  1468-2449              1-2# loss weekly lightly active:9875-1054  Maintenance calories for sedentary activity level: 2355  Protein:63-78gm      (1.2-1.5g/kg IBW)  Fluid: 1820ml     (35mL/kg IBW)    Nutrition Diagnosis  Yes;    Overweight/obesity  related to Excess energy intake as evidenced by  BMI more than normative standard for age and sex (obesity-grade III 40+)   "     Nutrition Intervention    Nutrition Prescription  Calories: suggested 3631-8529 cals at new weight of 306#  Protein:65-75gm  Fluid:1800ml    Meal Plan (Joce/Pro/Carb)  Breakfast:  200, 30g  Snack: 250, 14  Lunch:400, 21g  Snack:150-200, 0-5  Dinner:500, 28g  Snack:150, 0-5gm    Nutrition Education:    Healthy Core Manual  Calorie controlled menu  Lean protein food choices  Healthy snack options  Food journaling tips      Nutrition Counseling:  Strategies: meal planning, portion sizes, healthy snack choices, hydration, fiber intake, protein intake, exercise, food journal      Monitoring and Evaluation:  Evaluation criteria:  Energy Intake  Meet protein needs  Maintain adequate hydration  Monitor weekly weight  Meal planning/preparation  Food journal   Decreased portions at mealtimes and snacks  Physical activity     Barriers to learning:none  Readiness to change: Preparation:  (Getting ready to change)   Comprehension: good  Expected Compliance: good

## 2025-04-04 NOTE — PROGRESS NOTES
Program: [facility]    Assessment/Plan     Judy Pollard  is a 54 y.o. female with  returns to follow up  for treatment of excess body weight and associated risk factors/co-morbidities.     Class 3 severe obesity due to excess calories with serious comorbidity and body mass index (BMI) of 50.0 to 59.9 in adult (HCC)  Antiobesity Medications/Medical --  Is currently on her fourth box of 5 mg  She has lost 35 pounds since initiation-10.2%  We will increase the dose to 7.5 mg  Patient will continue same dose of metformin     -Other options such as Qsymia and its components phentermine and topiramate were discussed no current contraindications  -Contrave and its components were discussed-no contraindications  -Patient was considered for the [facility] bariatric surgery program 1.5 years ago and had attended initial classes; patient decided against doing bariatric surgery and would like to do medical weight loss        Nutrition:    Nutrition Prescription  Calories:7563-7298 cals  Protein:65-75gm  Fluid:1800ml  Patient states protein intake has been adequate due to using protein shakes    Physical Activity:   Has been using Rebounder   1-2 miles 3 days a week  Free weights - you tube videos selected by her       Sleep: -   STOP- BANG- 3/8 ; 7  hours - feel rested,     Food Behaviors/Stress/pyschosocial:    Was at a Christianity retreat where she ate a lot of salty food which slowed her weight loss  Encouraged her to use tracking as a tool to assess weight plateaus to see whether adjustments need to be made            Most recent notes and records were reviewed.         Return visit:  2-3 months     Nutrition   Do not skip meals. Avoid sugary beverages. At least 64oz of water daily.    Behavioral/Stress   Food log via alexia or provided paper log (alexia options include www.myfitnesspal.com, sparkpeople.com, Interleukin Geneticsit.com, Health in Reach.com, baribyUs.com). Encouraged mindful eating    Physical Activity  Increase  "physical activity by 10 minutes daily. Gradually increase physical activity to a goal of 5 days per week for 30 minutes of MODERATE intensity ( ( should be able to pass the \"talk test\" but should not be able to sing.  target 150-300 minutes  PLUS 2-3 days per week of FULL BODY resistance training. Progression will be addressed at follow up visits. Encouraged planning regarding establishing physical activity routine    Sleep  Provided sleep hygiene counseling and importance of having adequate sleep duration          Porsche was seen today for follow-up.    Diagnoses and all orders for this visit:    Class 3 severe obesity due to excess calories with serious comorbidity and body mass index (BMI) of 50.0 to 59.9 in adult ([facility])  -     tirzepatide (Zepbound) 7.5 mg/0.5 mL auto-injector; Inject 0.5 mL (7.5 mg total) under the skin once a week                  Total time spent reviewing chart, interviewing patient, examining patient, discussing plan, answering all questions, and documentin minutes with >50% face-to-face time with the patient.            Weight trajectory     Wt Readings from Last 20 Encounters:   25 (!) 138 kg (305 lb)   25 (!) 139 kg (306 lb 6.4 oz)   25 (!) 140 kg (307 lb 14.4 oz)   25 (!) 140 kg (307 lb 12.8 oz)   25 (!) 144 kg (317 lb 6.4 oz)   10/29/24 (!) 148 kg (326 lb 11.2 oz)   10/21/24 (!) 148 kg (325 lb 3.2 oz)   10/15/24 (!) 149 kg (327 lb 11.2 oz)   24 (!) 149 kg (328 lb 8 oz)   24 (!) 149 kg (327 lb 12.8 oz)   24 (!) 148 kg (326 lb 8 oz)   24 (!) 148 kg (326 lb 6.4 oz)   09/10/24 (!) 149 kg (329 lb 3.2 oz)   24 (!) 149 kg (327 lb 6.4 oz)   24 (!) 151 kg (332 lb)   24 (!) 151 kg (332 lb)   08/15/24 (!) 150 kg (330 lb 9.6 oz)   24 (!) 150 kg (331 lb 3.2 oz)   24 15.2 kg (33 lb 6.4 oz)   24 (!) 151 kg (333 lb 9.6 oz)             Lifestyle questionnaire     Nutrition Prescription  Calories:2490-0888 " cals  Protein:65-75gm  Fluid:1800ml    Diet recall:  8am-Premier protein shake  8am-Premier protein shake or eggs + fruit/low shahzad toast  10am-2 eggs + low shahzad toast, but mostly with fruit or protein shake if didn't have for breakfast.   Snack:-  Lunch: 1pm-turkey and cheese roll-up + pretzels OR salad or turkey sandwich or tuna salad  Snack: PB and apple or pretzels and PB (measured) OR Protein bar if later dinner  Dinner: 6p Paused Hello Fresh right now, doing more eating out because taking care of MI--Bethel Iqbal Srini nelson in a bowl or chick ivonne-a OR salad from diner. 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     Psycho social-lives with      Gyneac (Menopausal status/periods/contraception)-full hysterectomy at age 42            Start date: 6/19/24   Intial weight : 340 lbs  Intial BMI: 60.23 kg/m2  Obesity Class: Above 40- Obesity Class III  Goal weight: 220-230 lbs     Current Weight on 9/24/2024: 327 lbs  Current BMI : 58 kg/m2 Above 40- Obesity Class III  Difference: -13 lbs     Colonoscopy: UTD  Mammogram: UTD                   Anti obesity Medications/ Medical---    Weight loss medication and dose: Metformin  Date initiated: june 2024      Colonoscopy: Not on file   Mammogram: 09/07/2023    Weight on 1/6/2025 : 317 lbs(-10)  BMI on 1/6/2025  : 56.2 kg/m2 Above 40- Obesity Class III  Difference: -23 lbs    Weight on 4/8/2025 :(!) 138 kg  "(305 lb) (-12)  BMI on  4/8/2025 : Body mass index is 54.03 kg/m². Above 40- Obesity Class III  Difference: -35 lbs      Anti obesity Medications/ Medical---    Weight loss medication and dose: Metformin  Date initiated: January 2025         Anti obesity Medications/ Medical---    Weight loss medication and dose: Zepbound   Date initiated: Dec 2024              Objective         The following portions of the patient's history were reviewed and updated as appropriate: allergies, current medications, past family history, past medical history, past social history, past surgical history, and problem list.      /80   Pulse 82   Ht 5' 3\" (1.6 m)   Wt (!) 138 kg (305 lb)   SpO2 98%   BMI 54.03 kg/m²             Review of Systems   Constitutional:  Negative for fatigue.   HENT:  Negative for sore throat.    Respiratory:  Negative for cough and shortness of breath.    Cardiovascular:  Negative for chest pain, palpitations and leg swelling.   Gastrointestinal:  Negative for abdominal pain, constipation, diarrhea and nausea.   Genitourinary:  Negative for dysuria.   Musculoskeletal:  Negative for arthralgias and back pain.   Skin:  Negative for rash.   Neurological:  Negative for headaches.   Psychiatric/Behavioral:  Negative for dysphoric mood. The patient is not nervous/anxious.          Physical Exam  Vitals and nursing note reviewed.   Constitutional:       Appearance: Normal appearance.   HENT:      Head: Normocephalic.   Pulmonary:      Effort: Pulmonary effort is normal.   Neurological:      General: No focal deficit present.      Mental Status: She is alert and oriented to person, place, and time.   Psychiatric:         Mood and Affect: Mood normal.         Behavior: Behavior normal.         Thought Content: Thought content normal.         Judgment: Judgment normal.          Current medications       Current Outpatient Medications:     Glucosamine-Chondroitin (GLUCOSAMINE CHONDR COMPLEX PO), Take 2 tablets " by mouth daily, Disp: , Rfl:     levothyroxine 88 mcg tablet, Take 88 mcg by mouth daily, Disp: , Rfl:     metFORMIN (GLUCOPHAGE-XR) 500 mg 24 hr tablet, Start with 500 mg orally once daily with the evening meal increase in two weeks to 1000 mg (two tablets) if tolerated, Disp: 180 tablet, Rfl: 3    multivitamin (THERAGRAN) TABS, Take 1 tablet by mouth daily, Disp: , Rfl:     TART CHERRY PO, Take 2 capsules by mouth daily, Disp: , Rfl:     tirzepatide (Zepbound) 7.5 mg/0.5 mL auto-injector, Inject 0.5 mL (7.5 mg total) under the skin once a week, Disp: 2 mL, Rfl: 0    Turmeric 400 MG CAPS, Take 2 capsules by mouth daily, Disp: , Rfl:          Medication considerations/contraindications     -Patient denies personal history of pancreatitis. Patient also denies personal and family history of medullary thyroid cancer, and multiple endocrine neoplasia type 2 (MEN 2 tumor). -Patient denies any history of kidney stones, seizures, or glaucoma, diabetic retinopathy, gall bladder disease, gastroparesis, hyperthyroidism.  -Denies Hx of CAD, PAD, palpitations, arrhythmia, uncontrolled hypertension  -Denies uncontrolled anxiety or depression, suicidal behavior or thinking , insomnia or sleep disturbance.         Labs     Most recent labs reviewed   Lab Results   Component Value Date     01/01/2016    SODIUM 143 10/09/2023    K 4.4 10/09/2023     10/09/2023    CO2 28 10/09/2023    ANIONGAP 5 01/01/2016    AGAP 8 10/09/2023    BUN 10 10/09/2023    CREATININE 0.91 10/09/2023    GLUC 106 (H) 10/09/2023    CALCIUM 9.3 10/09/2023    AST 20 10/09/2023    ALT 22 10/09/2023    ALKPHOS 68 10/09/2023    PROT 6.9 12/26/2015    TP 6.7 10/09/2023    BILITOT 0.95 12/26/2015    TBILI 0.9 10/09/2023    EGFR 76 10/09/2023     Lab Results   Component Value Date    HGBA1C 5.9 (H) 06/22/2024     Lab Results   Component Value Date    NQJ8FKDQWIWY 14.485 (H) 06/22/2024    TSH 14.79 (H) 10/09/2023     Lab Results   Component Value Date     CHOLESTEROL 177 06/22/2024     Lab Results   Component Value Date    HDL 49 (L) 06/22/2024     Lab Results   Component Value Date    TRIG 101 06/22/2024     Lab Results   Component Value Date    LDLCALC 108 (H) 06/22/2024     Vit D, 25-Hydroxy   Date Value Ref Range Status   06/22/2024 22.1 (L) 30.0 - 100.0 ng/mL Final     Comment:     Vitamin D guidelines established by Clinical Guidelines Subcommittee  of the Endocrine Society Task Force, 2011    Deficiency <20ng/ml   Insufficiency 20-30ng/ml   Sufficient  ng/ml      Insulin, Fasting   Date Value Ref Range Status   06/22/2024 21.41 1.90 - 23.00 uIU/mL Final

## 2025-04-04 NOTE — ASSESSMENT & PLAN NOTE
Antiobesity Medications/Medical --  Is currently on her fourth box of 5 mg  She has lost 35 pounds since initiation-10.2%  We will increase the dose to 7.5 mg  Patient will continue same dose of metformin     -Other options such as Qsymia and its components phentermine and topiramate were discussed no current contraindications  -Contrave and its components were discussed-no contraindications  -Patient was considered for the [facility] bariatric surgery program 1.5 years ago and had attended initial classes; patient decided against doing bariatric surgery and would like to do medical weight loss        Nutrition:    Nutrition Prescription  Calories:2958-5645 cals  Protein:65-75gm  Fluid:1800ml  Patient states protein intake has been adequate due to using protein shakes    Physical Activity:   Has been using Rebounder   1-2 miles 3 days a week  Free weights - you tube videos selected by her       Sleep: -   STOP- BANG- 3/8 ; 7  hours - feel rested,     Food Behaviors/Stress/pyschosocial:    Was at a Jehovah's witness retreat where she ate a lot of salty food which slowed her weight loss  Encouraged her to use tracking as a tool to assess weight plateaus to see whether adjustments need to be made

## 2025-04-08 ENCOUNTER — OFFICE VISIT (OUTPATIENT)
Dept: BARIATRICS | Facility: CLINIC | Age: 55
End: 2025-04-08
Payer: COMMERCIAL

## 2025-04-08 VITALS
HEIGHT: 63 IN | WEIGHT: 293 LBS | HEART RATE: 82 BPM | DIASTOLIC BLOOD PRESSURE: 80 MMHG | BODY MASS INDEX: 51.91 KG/M2 | OXYGEN SATURATION: 98 % | SYSTOLIC BLOOD PRESSURE: 140 MMHG

## 2025-04-08 DIAGNOSIS — E66.813 CLASS 3 SEVERE OBESITY DUE TO EXCESS CALORIES WITH SERIOUS COMORBIDITY AND BODY MASS INDEX (BMI) OF 50.0 TO 59.9 IN ADULT: Primary | ICD-10-CM

## 2025-04-08 PROCEDURE — 99215 OFFICE O/P EST HI 40 MIN: CPT | Performed by: INTERNAL MEDICINE

## 2025-04-08 RX ORDER — TIRZEPATIDE 7.5 MG/.5ML
7.5 INJECTION, SOLUTION SUBCUTANEOUS WEEKLY
Qty: 2 ML | Refills: 0 | Status: SHIPPED | OUTPATIENT
Start: 2025-04-08 | End: 2025-06-03

## 2025-04-15 ENCOUNTER — CLINICAL SUPPORT (OUTPATIENT)
Dept: BARIATRICS | Facility: CLINIC | Age: 55
End: 2025-04-15

## 2025-04-15 VITALS — BODY MASS INDEX: 51.91 KG/M2 | HEIGHT: 63 IN | WEIGHT: 293 LBS

## 2025-04-15 DIAGNOSIS — R63.5 ABNORMAL WEIGHT GAIN: Primary | ICD-10-CM

## 2025-04-15 PROCEDURE — RECHECK

## 2025-05-06 ENCOUNTER — CLINICAL SUPPORT (OUTPATIENT)
Dept: BARIATRICS | Facility: CLINIC | Age: 55
End: 2025-05-06

## 2025-05-06 VITALS — WEIGHT: 293 LBS | BODY MASS INDEX: 51.91 KG/M2 | HEIGHT: 63 IN

## 2025-05-06 DIAGNOSIS — R63.5 ABNORMAL WEIGHT GAIN: Primary | ICD-10-CM

## 2025-05-06 PROCEDURE — RECHECK

## 2025-05-06 NOTE — PROGRESS NOTES
Weight Management Medical Nutrition Assessment  Pt presents today for [facility] (bi-weekly class) RD visit with current weight of 301#.  Pt has lost 37.9# overall weight loss (11%TBW). She has been traveling more lately to [place] to visit her new grandbaby.  She did notice that she likely doesn't drink enough water, especially on travel days. Noted some weight increase when she got home yesterday so she drank 3L of fluid yesterday and is now back down weight as of today.  She is packing her snack for the car--carrots, cucumber, protein bar, cheese, etc. Didn't even stop to get a meal at a rest stop because the line was too long and there weren't any good options. When she does go out she is being more mindful and looking up the nutritional info.     Patient seen by Medical Provider in past 6 months:  yes  Requested to schedule appointment with Medical Provider: Yes, scheduled    Anthropometric Measurements  Start Weight (#): 340# (6/19/24)  Healthy Core start weight:  339#  Current Weight (#): 301#  TBW % Change from start weight: 11%  Ideal Body Weight (#):115#  Goal Weight (#):<200#    Weight Loss History  Previous weight loss attempts: Commercial Programs (Weight Watchers, [employer], etc.)  Exercise  Self Created Diets (Portion Control, Healthy Food Choices, etc.)  Zoom and how blood sugars responded (based off of glycemic index)    Food and Nutrition Related History  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, logs on at 8am  Bed Time:11pm    Food Recall    8am-Premier protein shake OR eggs (1) + toast OR activia + flax + miracle + berries  9am-occ protein snack  Lunch: lately lunch has been 11am -salad with protein OR yogurt + pretzels if didn't have the yogurt for breakfast  Snack: 2:30pm-PB and apple or string cheese OR boiled egg   Dinner: 6:30p  When at home--protein, 1/4 to 1/2 cup starch, a large amt of veggies (chicken and fish, not often beef)  Snack:  sometimes will have nonfat milk w/ 100 shahzad pack  of cookies crisps--3x/week or nothing    Beverages: water and coffee with oatmilk creamer, juice/soda is very rare  Volume of beverage intake: 2L--continues to do well, except when she travels in the car.     Weekends: Worse, a little more eating out but still trying to make better choices ie:  veggies instead of fries or burger w/o bun  Cravings: salty crunchy snacks  Trouble area of day:  at times when it is a slow time of day, boredom eating    Frequency of Eating out: 2 times per week --Friday and/or Saturday  Food restrictions: tries to avoid gluten  Cooking: self   Food Shopping: self  Lives with her .  Has grown kids out of the house.     Physical Activity Intake  Activity:  a little harder lately because traveling to [place] to visit daughter and grandson.  Trying to move more, but not as much of a regular regimen.  Biking, stationary + rebounder.  Getting 3347-3890 steps per day but not as much of the structured exercise. Does have some light weights and resistance bands--uses the bands in between calls   Frequency: 3-4 times per week  , 20-30 mins  Physical limitations/barriers to exercise: some autoimmune issues    Estimated Needs  Energy  SECA: BMR:2095      X 1.3 -1000 = 1723  Repeat SECA BMR:  2045 x 1.3 = 1658 cals  REE:  2117, suggested wt loss range:  1700 cals (from  final visit)    [employer] Needs: (306# on 4/1/25)  BMR : 1961  1-2# loss weekly sedentary:  3134-4455              1-2# loss weekly lightly active:6595-9004  Maintenance calories for sedentary activity level: 2355  Protein:63-78gm      (1.2-1.5g/kg IBW)  Fluid: 1820ml     (35mL/kg IBW)    Nutrition Diagnosis  Yes;    Overweight/obesity  related to Excess energy intake as evidenced by  BMI more than normative standard for age and sex (obesity-grade III 40+)       Nutrition Intervention    Nutrition Prescription  Calories: suggested 5699-1272 cals at new weight of 306#  Protein:65-75gm  Fluid:1800ml    Meal Plan  (Joce/Pro/Carb)  Breakfast:  200, 30g  Snack: 250, 14  Lunch:400, 21g  Snack:150-200, 0-5  Dinner:500, 28g  Snack:150, 0-5gm    Nutrition Education:    Healthy Core Manual  Calorie controlled menu  Lean protein food choices  Healthy snack options  Food journaling tips      Nutrition Counseling:  Strategies: meal planning, portion sizes, healthy snack choices, hydration, fiber intake, protein intake, exercise, food journal      Monitoring and Evaluation:  Evaluation criteria:  Energy Intake  Meet protein needs  Maintain adequate hydration  Monitor weekly weight  Meal planning/preparation  Food journal   Decreased portions at mealtimes and snacks  Physical activity     Barriers to learning:none  Readiness to change: Preparation:  (Getting ready to change)   Comprehension: good  Expected Compliance: good

## 2025-05-14 DIAGNOSIS — E66.813 CLASS 3 SEVERE OBESITY DUE TO EXCESS CALORIES WITH SERIOUS COMORBIDITY AND BODY MASS INDEX (BMI) OF 50.0 TO 59.9 IN ADULT: Primary | ICD-10-CM

## 2025-05-14 RX ORDER — TIRZEPATIDE 10 MG/.5ML
10 INJECTION, SOLUTION SUBCUTANEOUS WEEKLY
Qty: 2 ML | Refills: 2 | Status: SHIPPED | OUTPATIENT
Start: 2025-05-14 | End: 2025-08-06

## 2025-05-20 ENCOUNTER — CLINICAL SUPPORT (OUTPATIENT)
Dept: BARIATRICS | Facility: CLINIC | Age: 55
End: 2025-05-20

## 2025-05-20 VITALS — BODY MASS INDEX: 51.91 KG/M2 | WEIGHT: 293 LBS | HEIGHT: 63 IN

## 2025-05-20 DIAGNOSIS — R63.5 ABNORMAL WEIGHT GAIN: Primary | ICD-10-CM

## 2025-05-20 PROCEDURE — RECHECK

## 2025-06-10 ENCOUNTER — CLINICAL SUPPORT (OUTPATIENT)
Dept: BARIATRICS | Facility: CLINIC | Age: 55
End: 2025-06-10

## 2025-06-10 VITALS — HEIGHT: 63 IN | BODY MASS INDEX: 51.6 KG/M2 | WEIGHT: 291.2 LBS

## 2025-06-10 DIAGNOSIS — R63.5 ABNORMAL WEIGHT GAIN: Primary | ICD-10-CM

## 2025-06-10 PROCEDURE — RECHECK

## 2025-06-10 NOTE — PROGRESS NOTES
Weight Management Medical Nutrition Assessment  Pt presents today for [facility] (bi-weekly class) RD visit with current weight of 291.2#.  Pt has lost 47.8# overall weight loss (14%TBW).  Has been having stress with renovations in the home Pt continues on Zepbound 10mg.  Only symptom is on first day of injection if eats dinner too late gets GERD at bed time. Pt reports she completed blood work and there were improvements in A1c and glucose, but TSH slightly elevated so meds are going to be adjusted. Reviewed diet recall and made some suggested to ensure adequate protein at each meal and snack ie:  try a greek yogurt at snack rather than the Activia.  Pt is seeing Dr Hollingsworth on July 8th, will hold off on HW classes for the summer and will likely rejoin after.     Patient seen by Medical Provider in past 6 months:  yes  Requested to schedule appointment with Medical Provider: Yes, scheduled    Anthropometric Measurements  Start Weight (#): 340# (6/19/24)  Healthy Core start weight:  339#  Current Weight (#): 291.2#  TBW % Change from start weight: 14%  Ideal Body Weight (#):115#  Goal Weight (#):<200#    Weight Loss History  Previous weight loss attempts: Commercial Programs (Weight Watchers, [id number], etc.)  Exercise  Self Created Diets (Portion Control, Healthy Food Choices, etc.)  Zoom and how blood sugars responded (based off of glycemic index)    Food and Nutrition Related History  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, logs on at 8am  Bed Time:10pm    Food Recall    8am-Premier protein shake before starts work   At break:  eggs (1) + toast OR activia + flax + miracle + berries  Lunch: 11am -salad with protein OR yogurt + pretzels if didn't have the yogurt for breakfast  Snack: 2:30pm-Yogurt (activia) (suggested yogurt with more protein ie: geek yogurt) + pretzel (1/2 serving crushed in yogurt)  Dinner: 6:30-7p  When at home--broccoli +mushrooms with chicken OR deconstructed stuffed peppers with  ground turkey OR Is looking up more recipes (has been making adjustments as needed)  Snack:  not often  Bed 10pm    Beverages: water and coffee with oatmilk creamer, juice/soda is very rare  Volume of beverage intake: 2L    Weekends: Worse, a little more eating out but still trying to make better choices ie: veggies instead of fries or burger w/o bun  Cravings: salty crunchy snacks  Trouble area of day:  at times when it is a slow time of day, boredom eating    Frequency of Eating out: 2 times per week --Friday and/or Saturday  Food restrictions: tries to avoid gluten  Cooking: self   Food Shopping: self  Lives with her .  Has grown kids out of the house.     Physical Activity Intake  Activity:  Has been doing more work around the house with renovations OR going out for a walk.  At times chair yoga.  Frequency:3-4 times per week , 20-30 mins  Physical limitations/barriers to exercise: some autoimmune issues    Estimated Needs  Energy  SECA: BMR:2095      X 1.3 -1000 = 1723  Repeat SECA BMR:  2045 x 1.3 = 1658 cals  REE:  2117, suggested wt loss range:  1700 cals (from HC final visit)    Cristy [employer] Needs: (306# on 4/1/25)  BMR : 1961  1-2# loss weekly sedentary:  7408-9794              1-2# loss weekly lightly active:9740-9571  Maintenance calories for sedentary activity level: 2355  Protein:63-78gm      (1.2-1.5g/kg IBW)  Fluid: 1820ml     (35mL/kg IBW)    Nutrition Diagnosis  Yes;    Overweight/obesity  related to Excess energy intake as evidenced by  BMI more than normative standard for age and sex (obesity-grade III 40+)       Nutrition Intervention    Nutrition Prescription  Calories: suggested 3650-6142 cals at new weight of 306#  Protein:65-75gm  Fluid:1800ml    Meal Plan (Joce/Pro/Carb)  Breakfast:  200, 30g  Snack: 250, 14  Lunch:400, 21g  Snack:150-200, 0-5  Dinner:500, 28g  Snack:150, 0-5gm    Nutrition Education:    Healthy Core Manual  Calorie controlled menu  Lean protein food  choices  Healthy snack options  Food journaling tips      Nutrition Counseling:  Strategies: meal planning, portion sizes, healthy snack choices, hydration, fiber intake, protein intake, exercise, food journal      Monitoring and Evaluation:  Evaluation criteria:  Energy Intake  Meet protein needs  Maintain adequate hydration  Monitor weekly weight  Meal planning/preparation  Food journal   Decreased portions at mealtimes and snacks  Physical activity     Barriers to learning:none  Readiness to change: Preparation:  (Getting ready to change)   Comprehension: good  Expected Compliance: good

## 2025-07-08 ENCOUNTER — OFFICE VISIT (OUTPATIENT)
Dept: BARIATRICS | Facility: CLINIC | Age: 55
End: 2025-07-08
Payer: COMMERCIAL

## 2025-07-08 VITALS
DIASTOLIC BLOOD PRESSURE: 84 MMHG | OXYGEN SATURATION: 99 % | HEART RATE: 89 BPM | HEIGHT: 63 IN | WEIGHT: 286 LBS | SYSTOLIC BLOOD PRESSURE: 120 MMHG | BODY MASS INDEX: 50.68 KG/M2

## 2025-07-08 DIAGNOSIS — E66.813 CLASS 3 SEVERE OBESITY DUE TO EXCESS CALORIES WITH SERIOUS COMORBIDITY AND BODY MASS INDEX (BMI) OF 50.0 TO 59.9 IN ADULT: Primary | ICD-10-CM

## 2025-07-08 DIAGNOSIS — E66.813 CLASS 3 SEVERE OBESITY DUE TO EXCESS CALORIES WITH BODY MASS INDEX (BMI) OF 60.0 TO 69.9 IN ADULT, UNSPECIFIED WHETHER SERIOUS COMORBIDITY PRESENT: ICD-10-CM

## 2025-07-08 PROCEDURE — 99215 OFFICE O/P EST HI 40 MIN: CPT | Performed by: INTERNAL MEDICINE

## 2025-07-08 RX ORDER — TIRZEPATIDE 10 MG/.5ML
10 INJECTION, SOLUTION SUBCUTANEOUS WEEKLY
Qty: 2 ML | Refills: 4 | Status: SHIPPED | OUTPATIENT
Start: 2025-07-08 | End: 2025-11-25

## 2025-07-08 RX ORDER — LEVOTHYROXINE SODIUM 112 UG/1
112 TABLET ORAL DAILY
COMMUNITY
Start: 2025-06-09

## 2025-07-08 RX ORDER — METFORMIN HYDROCHLORIDE 500 MG/1
TABLET, EXTENDED RELEASE ORAL
Qty: 180 TABLET | Refills: 3 | Status: SHIPPED | OUTPATIENT
Start: 2025-07-08

## 2025-07-08 NOTE — PROGRESS NOTES
Program: [facility]    Assessment/Plan     Judy Pollard  is a 55 y.o. female with  returns to follow up  for treatment of excess body weight and associated risk factors/co-morbidities.     Class 3 severe obesity due to excess calories with serious comorbidity and body mass index (BMI) of 50.0 to 59.9 in adult (HCC)  Antiobesity Medications/Medical --  Is currently on her fourth box of 10 mg.  We will continue the same dose  She has lost 35 pounds since initiation-15.8%  BMI went from 60.2-50.6    Patient will increase the dose of metformin 1500 mg     -Other options such as Qsymia and its components phentermine and topiramate were discussed no current contraindications  -Contrave and its components were discussed-no contraindications  -Patient was considered for the [facility] bariatric surgery program 1.5 years ago and had attended initial classes; patient decided against doing bariatric surgery and would like to do medical weight loss        Nutrition:    Nutrition Prescription  Calories:7822-5476 cals  Protein:65-75gm  Fluid:1800ml  Patient states protein intake has been adequate due to using protein shakes    Physical Activity:   Has been using Rebounder   Walking 1-2 miles 3 days a week  Free weights and bands  - you tube videos selected by her . Discussed progression   Doing yoga with her      Sleep: -   STOP- BANG- 3/8 ; 7  hours - feel rested,     Food Behaviors/Stress/pyschosocial:    Smaller portions at 4th July party- being mindful               Most recent notes and records were reviewed.         Return visit:  2-3 months     Nutrition   Do not skip meals. Avoid sugary beverages. At least 64oz of water daily.    Behavioral/Stress   Food log via alexia or provided paper log (alexia options include www.myfitnesspal.com, sparkpeople.com, ORDISSIMOit.com, calorieking.com, BagThat). Encouraged mindful eating    Physical Activity  Increase physical activity by 10 minutes daily.  "Gradually increase physical activity to a goal of 5 days per week for 30 minutes of MODERATE intensity ( ( should be able to pass the \"talk test\" but should not be able to sing.  target 150-300 minutes  PLUS 2-3 days per week of FULL BODY resistance training. Progression will be addressed at follow up visits. Encouraged planning regarding establishing physical activity routine    Sleep  Provided sleep hygiene counseling and importance of having adequate sleep duration          Porsche was seen today for follow-up.    Diagnoses and all orders for this visit:    Class 3 severe obesity due to excess calories with serious comorbidity and body mass index (BMI) of 50.0 to 59.9 in adult  -     tirzepatide (Zepbound) 10 mg/0.5 mL auto-injector; Inject 0.5 mL (10 mg total) under the skin once a week    Class 3 severe obesity due to excess calories with body mass index (BMI) of 60.0 to 69.9 in adult, unspecified whether serious comorbidity present  -     metFORMIN (GLUCOPHAGE-XR) 500 mg 24 hr tablet; Take 1 tablet with meals daily                    Total time spent reviewing chart, interviewing patient, examining patient, discussing plan, answering all questions, and documentin minutes with >50% face-to-face time with the patient.            Weight trajectory     Wt Readings from Last 20 Encounters:   25 130 kg (286 lb)   06/10/25 132 kg (291 lb 3.2 oz)   25 135 kg (298 lb 3.2 oz)   25 (!) 137 kg (301 lb)   04/15/25 (!) 137 kg (302 lb 12.8 oz)   25 (!) 138 kg (305 lb)   25 (!) 139 kg (306 lb 6.4 oz)   25 (!) 140 kg (307 lb 14.4 oz)   25 (!) 140 kg (307 lb 12.8 oz)   25 (!) 144 kg (317 lb 6.4 oz)   10/29/24 (!) 148 kg (326 lb 11.2 oz)   10/21/24 (!) 148 kg (325 lb 3.2 oz)   10/15/24 (!) 149 kg (327 lb 11.2 oz)   24 (!) 149 kg (328 lb 8 oz)   24 (!) 149 kg (327 lb 12.8 oz)   24 (!) 148 kg (326 lb 8 oz)   24 (!) 148 kg (326 lb 6.4 oz)   09/10/24 (!) 149 kg " (329 lb 3.2 oz)   09/06/24 (!) 149 kg (327 lb 6.4 oz)   09/03/24 (!) 151 kg (332 lb)             Lifestyle questionnaire     Nutrition Prescription  Calories:6437-9589 cals  Protein:65-75gm  Fluid:1800ml    Diet recall:  8am-Premier protein shake  8am-Premier protein shake or eggs + fruit/low shahzad toast  10am-2 eggs + low shahzad toast, but mostly with fruit or protein shake if didn't have for breakfast.   Snack:-  Lunch: 1pm-turkey and cheese roll-up + pretzels OR salad or turkey sandwich or tuna salad  Snack: PB and apple or pretzels and PB (measured) OR Protein bar if later dinner  Dinner: 6p Paused [employer] right now, doing more eating out because taking care of MI--Bethel Iqbal in a bowl or chick ivonne-a OR salad from diner. When at home--protein, starch, veggies (chicken and fish, not often beef)  Snack:  protein bar if earlier dinner  Eating out vs cooking at home- 2 times      Beverages  Water-- 64 +    Caffeine/tea-- one cup coffee with sweetened oat creamer , herbal tea    SSB- rare      Alcohol: once in 2 months  Smoking: no  Drug use: no     Physical Activity --chair yoga knees and back hurt, stationary bike 5-10 mins,  walking , rebounder looking at [employer]     Sleep -- STOP- BANG- 3/8 ; 7  hours - feel rested,      Occupation- [employer] 8-4:30 PM, [facility], [employer] standing desk at home     Psycho social-lives with      Gyneac (Menopausal status/periods/contraception)-full hysterectomy at age 42            Start date: 6/19/24   Intial weight : 340 lbs  Intial BMI: 60.23 kg/m2  Obesity Class: Above 40- Obesity Class III  Goal weight: 220-230 lbs     Weight on 9/24/2024: 327 lbs  Current BMI : 58 kg/m2 Above 40- Obesity Class III  Difference: -13 lbs    Weight on 1/6/2025 : 317 lbs(-10)  BMI on 1/6/2025  : 56.2 kg/m2 Above 40- Obesity Class III  Difference: -23 lbs    Weight on 4/8/25- 305 lbs  (-22)    Weight on 7/8/2025 :130 kg (286 lb)(-19)  BMI on   "7/8/2025 : Body mass index is 50.66 kg/m². Above 40- Obesity Class III  Difference: -54 lbs           Anti obesity Medications/ Medical---    Weight loss medication and dose: Metformin  Date initiated: January 2025         Anti obesity Medications/ Medical---    Weight loss medication and dose: Zepbound   Date initiated: Dec 2024        Colonoscopy: Not on file   Mammogram: 09/07/2023        Objective         The following portions of the patient's history were reviewed and updated as appropriate: allergies, current medications, past family history, past medical history, past social history, past surgical history, and problem list.      /84 (BP Location: Left arm, Patient Position: Sitting, Cuff Size: Large)   Pulse 89   Ht 5' 3\" (1.6 m)   Wt 130 kg (286 lb)   SpO2 99%   BMI 50.66 kg/m²             Review of Systems   Constitutional:  Negative for fatigue.   HENT:  Negative for sore throat.    Respiratory:  Negative for cough and shortness of breath.    Cardiovascular:  Negative for chest pain, palpitations and leg swelling.   Gastrointestinal:  Negative for abdominal pain, constipation, diarrhea and nausea.   Genitourinary:  Negative for dysuria.   Musculoskeletal:  Negative for arthralgias and back pain.   Skin:  Negative for rash.   Neurological:  Negative for headaches.   Psychiatric/Behavioral:  Negative for dysphoric mood. The patient is not nervous/anxious.          Physical Exam  Vitals and nursing note reviewed.   Constitutional:       Appearance: Normal appearance.   HENT:      Head: Normocephalic.   Pulmonary:      Effort: Pulmonary effort is normal.   Neurological:      General: No focal deficit present.      Mental Status: She is alert and oriented to person, place, and time.   Psychiatric:         Mood and Affect: Mood normal.         Behavior: Behavior normal.         Thought Content: Thought content normal.         Judgment: Judgment normal.          Current medications       Current " Outpatient Medications:     Glucosamine-Chondroitin (GLUCOSAMINE CHONDR COMPLEX PO), Take 2 tablets by mouth in the morning., Disp: , Rfl:     levothyroxine 112 mcg tablet, Take 112 mcg by mouth daily, Disp: , Rfl:     metFORMIN (GLUCOPHAGE-XR) 500 mg 24 hr tablet, Take 1 tablet with meals daily, Disp: 180 tablet, Rfl: 3    multivitamin (THERAGRAN) TABS, Take 1 tablet by mouth in the morning., Disp: , Rfl:     TART CHERRY PO, Take 2 capsules by mouth in the morning., Disp: , Rfl:     tirzepatide (Zepbound) 10 mg/0.5 mL auto-injector, Inject 0.5 mL (10 mg total) under the skin once a week, Disp: 2 mL, Rfl: 4    Turmeric 400 MG CAPS, Take 2 capsules by mouth in the morning., Disp: , Rfl:          Medication considerations/contraindications     -Patient denies personal history of pancreatitis. Patient also denies personal and family history of medullary thyroid cancer, and multiple endocrine neoplasia type 2 (MEN 2 tumor). -Patient denies any history of kidney stones, seizures, or glaucoma, diabetic retinopathy, gall bladder disease, gastroparesis, hyperthyroidism.  -Denies Hx of CAD, PAD, palpitations, arrhythmia, uncontrolled hypertension  -Denies uncontrolled anxiety or depression, suicidal behavior or thinking , insomnia or sleep disturbance.         Labs     Most recent labs reviewed   Lab Results   Component Value Date     01/01/2016    SODIUM 143 06/07/2025    K 4.3 06/07/2025     06/07/2025    CO2 26 06/07/2025    ANIONGAP 5 01/01/2016    AGAP 10 06/07/2025    BUN 17 06/07/2025    CREATININE 1.02 06/07/2025    GLUC 88 06/07/2025    CALCIUM 9.4 06/07/2025    AST 19 06/07/2025    ALT 14 06/07/2025    ALKPHOS 63 06/07/2025    PROT 6.9 12/26/2015    TP 6.8 06/07/2025    BILITOT 0.95 12/26/2015    TBILI 0.9 06/07/2025    EGFR 65 06/07/2025     Lab Results   Component Value Date    HGBA1C 5.4 06/07/2025     Lab Results   Component Value Date    JDR0KYVKPXOS 14.485 (H) 06/22/2024    TSH 11.14 (H)  06/07/2025     Lab Results   Component Value Date    CHOLESTEROL 177 06/22/2024     Lab Results   Component Value Date    HDL 49 (L) 06/22/2024     Lab Results   Component Value Date    TRIG 101 06/22/2024     Lab Results   Component Value Date    LDLCALC 108 (H) 06/22/2024     Vit D, 25-Hydroxy   Date Value Ref Range Status   06/22/2024 22.1 (L) 30.0 - 100.0 ng/mL Final     Comment:     Vitamin D guidelines established by Clinical Guidelines Subcommittee  of the Endocrine Society Task Force, 2011    Deficiency <20ng/ml   Insufficiency 20-30ng/ml   Sufficient  ng/ml      Insulin, Fasting   Date Value Ref Range Status   06/22/2024 21.41 1.90 - 23.00 uIU/mL Final

## 2025-07-08 NOTE — ASSESSMENT & PLAN NOTE
Antiobesity Medications/Medical --  Is currently on her fourth box of 10 mg.  We will continue the same dose  She has lost 35 pounds since initiation-15.8%  BMI went from 60.2-50.6    Patient will increase the dose of metformin 1500 mg     -Other options such as Qsymia and its components phentermine and topiramate were discussed no current contraindications  -Contrave and its components were discussed-no contraindications  -Patient was considered for the [facility] bariatric surgery program 1.5 years ago and had attended initial classes; patient decided against doing bariatric surgery and would like to do medical weight loss        Nutrition:    Nutrition Prescription  Calories:3655-4581 cals  Protein:65-75gm  Fluid:1800ml  Patient states protein intake has been adequate due to using protein shakes    Physical Activity:   Has been using Rebounder   Walking 1-2 miles 3 days a week  Free weights and bands  - you tube videos selected by her . Discussed progression   Doing yoga with her      Sleep: -   STOP- BANG- 3/8 ; 7  hours - feel rested,     Food Behaviors/Stress/pyschosocial:    Smaller portions at 4th July party- being mindful

## 2025-07-10 ENCOUNTER — TELEPHONE (OUTPATIENT)
Age: 55
End: 2025-07-10

## 2025-07-10 NOTE — TELEPHONE ENCOUNTER
"Received call from pharmacy regarding Metformin order.  Wanted to clarify frequency. Order states to take daily with meals.    Office note from 7/8 states \"Patient will increase the dose of metformin 1500 mg\"    Clarified this with pharmacy and they will dispense med as 500mg TID.  No further questions.  "

## 2025-08-15 ENCOUNTER — TELEPHONE (OUTPATIENT)
Dept: BARIATRICS | Facility: CLINIC | Age: 55
End: 2025-08-15